# Patient Record
Sex: FEMALE | Race: BLACK OR AFRICAN AMERICAN | Employment: FULL TIME | URBAN - NONMETROPOLITAN AREA
[De-identification: names, ages, dates, MRNs, and addresses within clinical notes are randomized per-mention and may not be internally consistent; named-entity substitution may affect disease eponyms.]

---
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
## 2020-02-10 ENCOUNTER — TELEPHONE (OUTPATIENT)
Dept: PEDIATRICS | Age: 20
End: 2020-02-10

## 2020-02-18 ENCOUNTER — OFFICE VISIT (OUTPATIENT)
Dept: PEDIATRICS | Age: 20
End: 2020-02-18
Payer: COMMERCIAL

## 2020-02-18 VITALS
TEMPERATURE: 98 F | HEART RATE: 88 BPM | BODY MASS INDEX: 24.33 KG/M2 | SYSTOLIC BLOOD PRESSURE: 120 MMHG | HEIGHT: 66 IN | DIASTOLIC BLOOD PRESSURE: 80 MMHG | WEIGHT: 151.4 LBS

## 2020-02-18 PROBLEM — A54.9 GONORRHEA: Status: RESOLVED | Noted: 2019-09-17 | Resolved: 2020-02-18

## 2020-02-18 PROBLEM — A74.9 CHLAMYDIA INFECTION: Status: RESOLVED | Noted: 2019-09-17 | Resolved: 2020-02-18

## 2020-02-18 PROBLEM — B00.9 HERPES SIMPLEX VIRUS INFECTION: Status: RESOLVED | Noted: 2019-09-17 | Resolved: 2020-02-18

## 2020-02-18 PROBLEM — R45.851 SUICIDAL IDEATION: Status: RESOLVED | Noted: 2018-01-31 | Resolved: 2020-02-18

## 2020-02-18 LAB
CONTROL: NORMAL
PREGNANCY TEST URINE, POC: NEGATIVE

## 2020-02-18 PROCEDURE — 81025 URINE PREGNANCY TEST: CPT | Performed by: PHYSICIAN ASSISTANT

## 2020-02-18 PROCEDURE — 99395 PREV VISIT EST AGE 18-39: CPT | Performed by: PHYSICIAN ASSISTANT

## 2020-02-18 PROCEDURE — 96372 THER/PROPH/DIAG INJ SC/IM: CPT | Performed by: PHYSICIAN ASSISTANT

## 2020-02-18 RX ORDER — MEDROXYPROGESTERONE ACETATE 150 MG/ML
150 INJECTION, SUSPENSION INTRAMUSCULAR ONCE
Status: COMPLETED | OUTPATIENT
Start: 2020-02-18 | End: 2020-02-18

## 2020-02-18 RX ADMIN — MEDROXYPROGESTERONE ACETATE 150 MG: 150 INJECTION, SUSPENSION INTRAMUSCULAR at 14:34

## 2020-02-18 NOTE — PROGRESS NOTES
After obtaining consent, and per orders of Shauna Peacock PA-C, injection of Medroxyprogesterone 150 mg given in Right upper quad. Gluteus IM by Seema Gonzalez. Patient tolerated injection well, no reaction noted.  SM
with PE form     3. Depo today     Will transition pt to Dr. Ezra Preciado at [facility]; she will be much like I was with pt I feel and pt needs honest discussion with her. Follow up in 1year(s) for routine physical exam or sooner prn.          Marvin Berman PA-C

## 2020-02-18 NOTE — PATIENT INSTRUCTIONS
Parenting: Preparing for Adolescence     What is adolescence? Adolescence is the time from puberty until adulthood. Adolescence is becoming a longer period of time for many. Children are becoming sexually mature at earlier ages. Young adults are more often attending trade school, college, or graduate school rather than getting jobs after high school. How will my child change physically? Parents notice physical changes in their child when puberty begins. Puberty may start as early as age 9 for girls and as late as 12 for boys. Hormones cause physical changes as well as emotional changes for adolescents. Physical changes include:   Both girls and boys become taller.  Girls' breasts develop and hair grows in underarm and pubic areas.  Boys' voices deepen and hair grows on their face, underarms, and pubic areas.  Both boys and girls start to have strong sexual urges, and are able to become parents themselves. Make sure your teen knows they can come to you with their questions about sex, birth control, pregnancy, and sexually transmitted diseases. Even though these talks may make you uncomfortable, you want your child to know your values while being educated on these issues. Be clear with your teen how you feel about premarital sexual behaviors, what the risks are if they engage in these behaviors. If you value abstinence (not having sex) then make sure they know this! If you want your teen to use condoms and birth control if they engage in sexual behaviors, tell them how to get these items. How will my child's thinking abilities change? Teens in their early years have trouble understanding another person's perspective (particularly parents!). They believe that their experiences are so unique that no one (again, particularly parents) can understand what they are feeling. Young teens also struggle with abstract, logical thought.  Their thinking tends to be more concrete and they see most things in terms of black and white. Learning new abstract material, such as algebra, can be challenging for the young teen who thinks in black/white terms. Older teenagers are able to see more of the big picture. They also tend to question rather than accept information and values. This means they may engage in heated debates with parents over anything that they think is illogical about their parents' views. How will my child change socially? The main \"job\" or task of adolescence is for the teen to establish their identity. This means they will spend a great deal of time trying to decide who they are, what values they believe in, and what they want to accomplish in life. It is a time to start deciding for themselves what is right and wrong. Teens may try different behaviors in different situations to find out what fits best for them. For example, teens may try being studious, try drugs or alcohol, or try other behaviors because they want to be part of the popular crowd. Other teens may not struggle with the identity issue at all. They may simply accept their parents' values and expectations for their lives. Some teens deliberately choose values that are opposite of their parents. Some teens may not establish a firm identity until early adulthood or later. Adolescents establish their own identities by  themselves from their parents and becoming more influenced by their peers. This does not mean that parents lose the ability to influence their teenager. Most teens have views on politics, Zoroastrian, and social issues that are very close to their parents' views. Only 5% of all [place] teenagers state that they do not ever get along with their parents. The majority of teens do have positive relationships with their parents. What can I do to help my child? There are many things parents can do during this period to help, such as:   Encourage strong family relationships.  Listen and keep the lines of communication open between you and your child. Tell them often that you love them. Respect their privacy, unless they show unsafe behaviors. Discipline with love and common sense.  Make spirituality an important part of family life. Teens with strong Confucianism beliefs have lower rates of alcohol, cigarette, and marijuana use.  Help your child build connections with others by volunteering their time in a meaningful way.  Be aware that you are still your child's role model. Watch your use of alcohol, daily diet, exercise, and how you manage your anger.  Get to know their friends. Invite them over or volunteer to drive them to activities.  Encourage your child to participate in extracurricular activities. Be involved in the lives of your children. Attend their activities and know what their stressors are.  Help your child develop problem solving skills. Allow them to learn from the consequences of their actions.  Keep a sense of humor and maintain your perspective. Understand that their culture, music, and clothing styles will be different than what you are used to, and probably different that what you would like.  Admit your own mistakes to your child and apologize when needed.  Get professional help for teens who self-harm, abuse drugs or alcohol, or make suicidal or homicidal threats. We are committed to providing you with the best care possible. In order to help us achieve these goals please remember to bring all medications, herbal products, and over the counter supplements with you to each visit. If your provider has ordered testing for you, please be sure to follow up with our office if you have not received results within 7 days after the testing took place. *If you receive a survey after visiting one of our offices, please take time to share your experience concerning your physician office visit. These surveys are confidential and no health information about you is shared.   We are eager to improve for you and we are counting on your feedback to help make that happen. tb

## 2020-02-21 ENCOUNTER — TELEPHONE (OUTPATIENT)
Dept: PEDIATRICS | Age: 20
End: 2020-02-21

## 2020-02-21 LAB
QUANTI TB GOLD PLUS: NEGATIVE
QUANTI TB1 MINUS NIL: 0 IU/ML (ref 0–0.34)
QUANTI TB2 MINUS NIL: 0 IU/ML (ref 0–0.34)
QUANTIFERON MITOGEN: 7.65 IU/ML
QUANTIFERON NIL: 0.05 IU/ML

## 2020-02-21 NOTE — TELEPHONE ENCOUNTER
----- Message from Shauna Peacock PA-C sent at 2/21/2020 12:01 PM CST -----  Call pt and let her know TB test was negative.  Then print out her last office note and this lab and scan it and email it to her (email in demo is correct) she can print this off and take to her school

## 2020-07-07 ENCOUNTER — TELEPHONE (OUTPATIENT)
Dept: PEDIATRICS | Age: 20
End: 2020-07-07

## 2020-07-08 NOTE — TELEPHONE ENCOUNTER
Mom asking about who April wanted [facility] to establish care with.  Mom advised

## 2020-08-20 ENCOUNTER — OFFICE VISIT (OUTPATIENT)
Dept: PRIMARY CARE CLINIC | Age: 20
End: 2020-08-20
Payer: COMMERCIAL

## 2020-08-20 VITALS
RESPIRATION RATE: 16 BRPM | BODY MASS INDEX: 26.62 KG/M2 | SYSTOLIC BLOOD PRESSURE: 118 MMHG | HEART RATE: 78 BPM | WEIGHT: 159.8 LBS | HEIGHT: 65 IN | DIASTOLIC BLOOD PRESSURE: 80 MMHG | OXYGEN SATURATION: 99 % | TEMPERATURE: 98 F

## 2020-08-20 LAB
ALBUMIN SERPL-MCNC: 4.8 G/DL (ref 3.5–5.2)
ALP BLD-CCNC: 87 U/L (ref 35–104)
ALT SERPL-CCNC: 20 U/L (ref 5–33)
ANION GAP SERPL CALCULATED.3IONS-SCNC: 13 MMOL/L (ref 7–19)
AST SERPL-CCNC: 18 U/L (ref 5–32)
BASOPHILS ABSOLUTE: 0 K/UL (ref 0–0.2)
BASOPHILS RELATIVE PERCENT: 0.7 % (ref 0–1)
BILIRUB SERPL-MCNC: <0.2 MG/DL (ref 0.2–1.2)
BUN BLDV-MCNC: 15 MG/DL (ref 6–20)
CALCIUM SERPL-MCNC: 9.4 MG/DL (ref 8.6–10)
CHLORIDE BLD-SCNC: 104 MMOL/L (ref 98–111)
CO2: 24 MMOL/L (ref 22–29)
CREAT SERPL-MCNC: 0.8 MG/DL (ref 0.5–0.9)
EOSINOPHILS ABSOLUTE: 0.1 K/UL (ref 0–0.6)
EOSINOPHILS RELATIVE PERCENT: 1.7 % (ref 0–5)
GFR AFRICAN AMERICAN: >59
GFR NON-AFRICAN AMERICAN: >60
GLUCOSE BLD-MCNC: 78 MG/DL (ref 74–109)
HAV IGM SER IA-ACNC: NORMAL
HCT VFR BLD CALC: 40.7 % (ref 37–47)
HEMOGLOBIN: 13 G/DL (ref 12–16)
HEPATITIS B CORE IGM ANTIBODY: NORMAL
HEPATITIS B SURFACE ANTIGEN INTERPRETATION: NORMAL
HEPATITIS C ANTIBODY INTERPRETATION: NORMAL
IMMATURE GRANULOCYTES #: 0 K/UL
LYMPHOCYTES ABSOLUTE: 1.4 K/UL (ref 1.1–4.5)
LYMPHOCYTES RELATIVE PERCENT: 34.5 % (ref 20–40)
MCH RBC QN AUTO: 27.2 PG (ref 27–31)
MCHC RBC AUTO-ENTMCNC: 31.9 G/DL (ref 33–37)
MCV RBC AUTO: 85.1 FL (ref 81–99)
MONOCYTES ABSOLUTE: 0.3 K/UL (ref 0–0.9)
MONOCYTES RELATIVE PERCENT: 7.9 % (ref 0–10)
NEUTROPHILS ABSOLUTE: 2.3 K/UL (ref 1.5–7.5)
NEUTROPHILS RELATIVE PERCENT: 55 % (ref 50–65)
PDW BLD-RTO: 11.8 % (ref 11.5–14.5)
PLATELET # BLD: 294 K/UL (ref 130–400)
PMV BLD AUTO: 9.5 FL (ref 9.4–12.3)
POTASSIUM SERPL-SCNC: 3.6 MMOL/L (ref 3.5–5)
RBC # BLD: 4.78 M/UL (ref 4.2–5.4)
SODIUM BLD-SCNC: 141 MMOL/L (ref 136–145)
TOTAL PROTEIN: 7.3 G/DL (ref 6.6–8.7)
WBC # BLD: 4.2 K/UL (ref 4.8–10.8)

## 2020-08-20 PROCEDURE — 36415 COLL VENOUS BLD VENIPUNCTURE: CPT | Performed by: FAMILY MEDICINE

## 2020-08-20 PROCEDURE — 99203 OFFICE O/P NEW LOW 30 MIN: CPT | Performed by: FAMILY MEDICINE

## 2020-08-20 RX ORDER — ESCITALOPRAM OXALATE 10 MG/1
10 TABLET ORAL DAILY
Qty: 30 TABLET | Refills: 3 | Status: SHIPPED | OUTPATIENT
Start: 2020-08-20 | End: 2020-09-17 | Stop reason: SDUPTHER

## 2020-08-22 LAB — HIV 1,2 COMBO ANTIGEN/ANTIBODY: NEGATIVE

## 2020-08-26 ASSESSMENT — ENCOUNTER SYMPTOMS
EYE DISCHARGE: 0
BACK PAIN: 0
WHEEZING: 0
NAUSEA: 0
VOMITING: 0
DIARRHEA: 0
COLOR CHANGE: 0
COUGH: 0
ABDOMINAL PAIN: 0

## 2020-08-26 NOTE — PROGRESS NOTES
SUBJECTIVE:    Patient ID: Sabine Ortiz is a 21 y.o. female. HPI:     Patient presents today to establish care. She states that she is recently been incarcerated. She is wanting to have testing done for HIV and hepatitis. She states that she does not have any known exposures that she is aware of. She states that she just would like to be tested to rule that out. She states that she is having trouble with anxiety. She states that she is very stressed. She currently has a court date upcoming and states that this is very stressful for her. She states that she has a longstanding history of THC and marijuana usage. She states that she was self-medicating her stress and anxiety but now she cannot do that because she is on parole. She states that she just feels like she needs something because she has difficulty calming down and has difficulty turning her mind off. She states that she does stay anxious and irritable. She states that she has difficulty sleeping due to the anxiety. She has never been prescribed anything for anxiety. Past Medical History:   Diagnosis Date    Allergy     Environmental allergies      Current Outpatient Medications on File Prior to Visit   Medication Sig Dispense Refill    medroxyPROGESTERone (DEPO-PROVERA) 150 MG/ML injection Inject 1 mL into the muscle once for 1 dose 1 mL 3     No current facility-administered medications on file prior to visit.       No Known Allergies  Past Surgical History:   Procedure Laterality Date    ADENOIDECTOMY  07-        TONSILLECTOMY  07-    Dr Young Major       Family History   Problem Relation Age of Onset    Hypertension Mother      Social History     Socioeconomic History    Marital status: Single     Spouse name: Not on file    Number of children: Not on file    Years of education: Not on file    Highest education level: Not on file   Occupational History    Not on file

## 2020-09-17 ENCOUNTER — OFFICE VISIT (OUTPATIENT)
Dept: PRIMARY CARE CLINIC | Age: 20
End: 2020-09-17
Payer: COMMERCIAL

## 2020-09-17 VITALS
DIASTOLIC BLOOD PRESSURE: 70 MMHG | TEMPERATURE: 98.4 F | BODY MASS INDEX: 27.16 KG/M2 | WEIGHT: 163 LBS | HEART RATE: 61 BPM | OXYGEN SATURATION: 99 % | RESPIRATION RATE: 16 BRPM | HEIGHT: 65 IN | SYSTOLIC BLOOD PRESSURE: 110 MMHG

## 2020-09-17 PROCEDURE — 99213 OFFICE O/P EST LOW 20 MIN: CPT | Performed by: FAMILY MEDICINE

## 2020-09-17 RX ORDER — ESCITALOPRAM OXALATE 20 MG/1
20 TABLET ORAL DAILY
Qty: 30 TABLET | Refills: 5 | Status: SHIPPED | OUTPATIENT
Start: 2020-09-17 | End: 2022-04-23

## 2020-09-17 RX ORDER — BUSPIRONE HYDROCHLORIDE 7.5 MG/1
7.5 TABLET ORAL 3 TIMES DAILY
Qty: 90 TABLET | Refills: 0 | Status: SHIPPED | OUTPATIENT
Start: 2020-09-17 | End: 2020-10-17

## 2020-09-24 ASSESSMENT — ENCOUNTER SYMPTOMS
WHEEZING: 0
BACK PAIN: 0
COLOR CHANGE: 0
VOMITING: 0
DIARRHEA: 0
NAUSEA: 0
ABDOMINAL PAIN: 0
COUGH: 0
EYE DISCHARGE: 0

## 2020-09-24 NOTE — PROGRESS NOTES
SUBJECTIVE:    Patient ID: Debbie Barba is a 21 y.o. female. HPI:   Patient presents today for 1 month follow-up. She states the Lexapro has not really helped much. She states that she does feel a little bit of a difference but not significantly. She states that she is still staying very anxious. She states that she does smoke a lot of weed. She states that she just has difficulty finding that anxiety relief from anything other than that. She states that she stills feels like she is edgy and does feel like she occasionally has panic attacks. She does not have any depression. She denies any suicidal thoughts. She denies any side effects to the medication. Past Medical History:   Diagnosis Date    Allergy     Environmental allergies       Current Outpatient Medications   Medication Sig Dispense Refill    escitalopram (LEXAPRO) 20 MG tablet Take 1 tablet by mouth daily 30 tablet 5    busPIRone (BUSPAR) 7.5 MG tablet Take 1 tablet by mouth 3 times daily 90 tablet 0     No current facility-administered medications for this visit. No Known Allergies    Review of Systems   Constitutional: Negative for activity change, appetite change and fever. HENT: Negative for congestion and nosebleeds. Eyes: Negative for discharge. Respiratory: Negative for cough and wheezing. Cardiovascular: Negative for chest pain and leg swelling. Gastrointestinal: Negative for abdominal pain, diarrhea, nausea and vomiting. Genitourinary: Negative for difficulty urinating, frequency and urgency. Musculoskeletal: Negative for back pain and gait problem. Skin: Negative for color change and rash. Neurological: Negative for dizziness and headaches. Hematological: Does not bruise/bleed easily. Psychiatric/Behavioral: Negative for sleep disturbance and suicidal ideas. The patient is nervous/anxious. OBJECTIVE:     Physical Exam  Vitals signs reviewed.    Constitutional:       General: She is not in acute distress. Appearance: Normal appearance. She is well-developed. She is not diaphoretic. HENT:      Head: Normocephalic and atraumatic. Right Ear: External ear normal.      Left Ear: External ear normal.   Neck:      Musculoskeletal: Normal range of motion and neck supple. Cardiovascular:      Rate and Rhythm: Normal rate and regular rhythm. Pulses: Normal pulses. Heart sounds: Normal heart sounds. No murmur. Pulmonary:      Effort: Pulmonary effort is normal. No respiratory distress. Breath sounds: Normal breath sounds. Skin:     General: Skin is warm and dry. Neurological:      General: No focal deficit present. Mental Status: She is alert and oriented to person, place, and time. Mental status is at baseline. Psychiatric:         Mood and Affect: Mood normal.         Behavior: Behavior normal.         Thought Content: Thought content normal.         Judgment: Judgment normal.        /70 (Site: Left Upper Arm, Position: Sitting, Cuff Size: Medium Adult)   Pulse 61   Temp 98.4 °F (36.9 °C) (Temporal)   Resp 16   Ht 5' 5\" (1.651 m)   Wt 163 lb (73.9 kg)   SpO2 99%   BMI 27.12 kg/m²      ASSESSMENT:    Андрей was seen today for 1 month follow-up. Diagnoses and all orders for this visit:    Anxiety    Stress    Other orders  -     escitalopram (LEXAPRO) 20 MG tablet; Take 1 tablet by mouth daily  -     busPIRone (BUSPAR) 7.5 MG tablet; Take 1 tablet by mouth 3 times daily        PLAN:    Increase Lexapro to 20 mg daily. I am going to add BuSpar to her medications as well to see if this will help take more of the edge off anxiety. Follow-up with us in 4 weeks for recheck unless needed sooner. EMR Dragon/transcription disclaimer:  Much of this encounter note is electronic transcription/translation of spoken language toprinted texts.   The electronic translation of spoken language may be erroneous, or at times, nonsensical words or phrases may be inadvertently transcribed.   Although I have reviewed the note for such errors, some may stillexist.

## 2020-11-01 ENCOUNTER — HOSPITAL ENCOUNTER (EMERGENCY)
Age: 20
Discharge: HOME | End: 2020-11-01
Payer: COMMERCIAL

## 2020-11-01 VITALS
OXYGEN SATURATION: 100 % | HEIGHT: 65 IN | BODY MASS INDEX: 25.49 KG/M2 | WEIGHT: 153 LBS | RESPIRATION RATE: 20 BRPM | TEMPERATURE: 98 F | HEART RATE: 75 BPM

## 2020-11-01 PROCEDURE — 99999 PR OFFICE/OUTPT VISIT,PROCEDURE ONLY: CPT | Performed by: PHYSICIAN ASSISTANT

## 2020-11-01 PROCEDURE — 99283 EMERGENCY DEPT VISIT LOW MDM: CPT

## 2020-11-01 RX ORDER — VALACYCLOVIR HYDROCHLORIDE 1 G/1
1000 TABLET, FILM COATED ORAL DAILY
Qty: 5 TABLET | Refills: 0 | Status: SHIPPED | OUTPATIENT
Start: 2020-11-01 | End: 2021-04-30 | Stop reason: SDUPTHER

## 2020-11-01 ASSESSMENT — ENCOUNTER SYMPTOMS
EYE PAIN: 0
SHORTNESS OF BREATH: 0
ABDOMINAL DISTENTION: 0
COLOR CHANGE: 0
COUGH: 0
APNEA: 0
RHINORRHEA: 0
PHOTOPHOBIA: 0
EYE DISCHARGE: 0
NAUSEA: 0
SORE THROAT: 0
ABDOMINAL PAIN: 0
BACK PAIN: 0

## 2020-11-01 ASSESSMENT — PAIN SCALES - GENERAL: PAINLEVEL_OUTOF10: 4

## 2020-11-01 NOTE — ED PROVIDER NOTES
[facility]  eMERGENCYdEPARTMENT eNCOUnter      Pt Name: Jassi Chanel  MRN: [id number]  [place] 2000  Date of evaluation: 11/1/2020  [address] [place] [address]    CHIEF COMPLAINT       Chief Complaint   Patient presents with    Insect Bite     Pt to ED with c/o possible spider bite to right glut         HISTORY OF PRESENT ILLNESS  (Location/Symptom, Timing/Onset, Context/Setting, Quality, Duration, Modifying Factors, Severity.)   Jassi Chanel is a 21 y.o. female who presents to the emergency department with complaints of shingles wound about right hamstring started Thursday. Correlates with sleeping with pants wondering if spider was in her pants. Is vesicular in character developing more and more neuritis characteristic. HPI    Nursing Notes were reviewed and I agree. REVIEW OF SYSTEMS    (2-9 systems for level 4, 10 or more for level 5)     Review of Systems   Constitutional: Negative for activity change, appetite change, chills and fever. HENT: Negative for congestion, postnasal drip, rhinorrhea and sore throat. Eyes: Negative for photophobia, pain, discharge and visual disturbance. Respiratory: Negative for apnea, cough and shortness of breath. Cardiovascular: Negative for chest pain and leg swelling. Gastrointestinal: Negative for abdominal distention, abdominal pain and nausea. Genitourinary: Negative for vaginal bleeding. Musculoskeletal: Negative for arthralgias, back pain, joint swelling, neck pain and neck stiffness. Skin: Positive for wound. Negative for color change and rash. Neurological: Negative for dizziness, syncope, facial asymmetry and headaches. Hematological: Negative for adenopathy. Does not bruise/bleed easily. Psychiatric/Behavioral: Negative for agitation, behavioral problems and confusion. Except as noted above the remainder of the review of systems was reviewed and negative.        PAST MEDICAL HISTORY     Past Medical History: Diagnosis Date    Allergy     Environmental allergies          SURGICAL HISTORY       Past Surgical History:   Procedure Laterality Date    ADENOIDECTOMY  07-        TONSILLECTOMY  07-    Dr Alivia Aguirre       Previous Medications    ESCITALOPRAM (LEXAPRO) 20 MG TABLET    Take 1 tablet by mouth daily       ALLERGIES     Patient has no known allergies.     FAMILY HISTORY       Family History   Problem Relation Age of Onset    Hypertension Mother           SOCIAL HISTORY       Social History     Socioeconomic History    Marital status: Single     Spouse name: None    Number of children: None    Years of education: None    Highest education level: None   Occupational History    None   Social Needs    Financial resource strain: None    Food insecurity     Worry: None     Inability: None    Transportation needs     Medical: None     Non-medical: None   Tobacco Use    Smoking status: Never Smoker    Smokeless tobacco: Never Used   Substance and Sexual Activity    Alcohol use: No    Drug use: Yes     Types: Marijuana    Sexual activity: Yes     Partners: Male     Birth control/protection: Injection   Lifestyle    Physical activity     Days per week: None     Minutes per session: None    Stress: None   Relationships    Social connections     Talks on phone: None     Gets together: None     Attends Latter day service: None     Active member of club or organization: None     Attends meetings of clubs or organizations: None     Relationship status: None    Intimate partner violence     Fear of current or ex partner: None     Emotionally abused: None     Physically abused: None     Forced sexual activity: None   Other Topics Concern    None   Social History Narrative    None       SCREENINGS           PHYSICAL EXAM    (up to 7 forlevel 4, 8 or more for level 5)     ED Triage Vitals [11/01/20 1533]   BP Temp Temp src Pulse Resp SpO2 Height Weight   -- 98 °F (36.7 °C) -- 75 20 100 % 5' 5\" (1.651 m) 153 lb (69.4 kg)       Physical Exam  Vitals signs and nursing note reviewed. Constitutional:       General: She is not in acute distress. Appearance: Normal appearance. She is well-developed. She is not diaphoretic. HENT:      Head: Normocephalic and atraumatic. Right Ear: External ear normal.      Left Ear: External ear normal.      Mouth/Throat:      Pharynx: No oropharyngeal exudate. Eyes:      General:         Right eye: No discharge. Left eye: No discharge. Pupils: Pupils are equal, round, and reactive to light. Neck:      Musculoskeletal: Normal range of motion and neck supple. Thyroid: No thyromegaly. Cardiovascular:      Rate and Rhythm: Normal rate and regular rhythm. Pulses: Normal pulses. Heart sounds: Normal heart sounds. No murmur. No friction rub. Pulmonary:      Effort: Pulmonary effort is normal. No respiratory distress. Breath sounds: Normal breath sounds. No stridor. No wheezing. Abdominal:      General: Bowel sounds are normal. There is no distension. Palpations: Abdomen is soft. Tenderness: There is no abdominal tenderness. Musculoskeletal: Normal range of motion. Legs:    Skin:     General: Skin is warm and dry. Capillary Refill: Capillary refill takes less than 2 seconds. Findings: No rash. Neurological:      General: No focal deficit present. Mental Status: She is alert and oriented to person, place, and time. Cranial Nerves: No cranial nerve deficit. Sensory: No sensory deficit. Coordination: Coordination normal.   Psychiatric:         Mood and Affect: Mood normal.         Behavior: Behavior normal.         Thought Content:  Thought content normal.         Judgment: Judgment normal.           DIAGNOSTIC RESULTS     RADIOLOGY:   Non-plain film images such as CT, Ultrasound and MRI are read by the radiologist. Mirtha Valadez radiographic images are visualized and preliminarilyinterpreted by No att. providers found with the below findings:      Interpretation per the Radiologist below, if available at the time of this note:    No orders to display       LABS:  Labs Reviewed - No data to display    All other labs were within normal range or notreturned as of this dictation. RE-ASSESSMENT        EMERGENCY DEPARTMENT COURSE and DIFFERENTIAL DIAGNOSIS/MDM:   Vitals:    Vitals:    11/01/20 1533   Pulse: 75   Resp: 20   Temp: 98 °F (36.7 °C)   SpO2: 100%   Weight: 153 lb (69.4 kg)   Height: 5' 5\" (1.651 m)       MDM  Findings consistent with shingles over 72 hours out will prescribe naproxen and vesicle appear however follow closely with PCP in 2 to 3 days. PROCEDURES:    Procedures      FINAL IMPRESSION      1.  Herpes zoster without complication          DISPOSITION/PLAN   DISPOSITION Decision To Discharge 11/01/2020 04:05:33 PM      PATIENT REFERRED TO:  [facility] EMERGENCY DEPT  [facility]  [phone]    If symptoms worsen    Tania Pelletier MD  [address] [place] [address]  [phone]    In 3 days  For wound re-check      DISCHARGE MEDICATIONS:  New Prescriptions    NAPROXEN (NAPROXEN) 500 MG EC TABLET    Take 1 tablet by mouth 2 times daily (with meals)    VALACYCLOVIR (VALTREX) 1 G TABLET    Take 1 tablet by mouth daily for 5 days       (Please note that portions of this note were completed with a voice recognition program.  Efforts were made to edit the dictations but occasionallywords are mis-transcribed.)    Madiha Horner 981, [address]  11/01/20 2087

## 2020-11-01 NOTE — ED NOTES
Darío Guzman [place] and female RN at bedside to assess gluteal insect bite.      Radha Madrid RN  11/01/20 9304

## 2020-11-17 ENCOUNTER — OFFICE VISIT (OUTPATIENT)
Dept: PRIMARY CARE CLINIC | Age: 20
End: 2020-11-17
Payer: COMMERCIAL

## 2020-11-17 VITALS
HEIGHT: 65 IN | SYSTOLIC BLOOD PRESSURE: 124 MMHG | DIASTOLIC BLOOD PRESSURE: 78 MMHG | BODY MASS INDEX: 28.32 KG/M2 | OXYGEN SATURATION: 99 % | TEMPERATURE: 98.9 F | WEIGHT: 170 LBS | HEART RATE: 75 BPM

## 2020-11-17 PROCEDURE — 99213 OFFICE O/P EST LOW 20 MIN: CPT | Performed by: NURSE PRACTITIONER

## 2020-11-17 RX ORDER — AMOXICILLIN AND CLAVULANATE POTASSIUM 875; 125 MG/1; MG/1
1 TABLET, FILM COATED ORAL 2 TIMES DAILY
Qty: 14 TABLET | Refills: 0 | Status: SHIPPED | OUTPATIENT
Start: 2020-11-17 | End: 2020-11-24

## 2020-11-17 ASSESSMENT — ENCOUNTER SYMPTOMS
FACIAL SWELLING: 0
EYES NEGATIVE: 1
GASTROINTESTINAL NEGATIVE: 1
RESPIRATORY NEGATIVE: 1
ALLERGIC/IMMUNOLOGIC NEGATIVE: 1

## 2020-11-17 NOTE — PROGRESS NOTES
[address] [facility]  [address] [address] [place] [address]  [address] [place] [address]  Dept: [phone]  Dept Fax: [phone]  Loc: [phone]    Uvaldo Jennings is a 21 y.o. female who presents today for her medical conditions/complaints as noted below. Uvaldo Jennings is c/o of Ear Problem (Right ear pain, pressure x 4 days ago. Feels like its going to pop. )        HPI:     HPI     Pt reports increasing ear pain to her right ear over the last 4 days. She reports previously being told that she had a small hole in that ear drum but that they would watch it. She reports using Q-tips to clean her ears and after using it 4 days ago it started hurting very bad. She believes that she has an infection. She has seen Dr. Marvel Garsia for ENT needs. Chief Complaint   Patient presents with    Ear Problem     Right ear pain, pressure x 4 days ago. Feels like its going to pop.       Past Medical History:   Diagnosis Date    Allergy     Environmental allergies       Past Surgical History:   Procedure Laterality Date    ADENOIDECTOMY  07-        TONSILLECTOMY  07-    Dr Bunch    TYMPANOSTOMY [facility] 11/17/2020 11/1/2020 9/17/2020 8/20/2020 2/18/2020 76/62/1219   SYSTOLIC 225 - [phone]   DIASTOLIC 78 - 70 80 80 81   Site - - Left Upper Arm Left Upper Arm Right Upper Arm Left Upper Arm   Position - - Sitting Sitting Sitting Sitting   Cuff Size - - Medium Adult Medium Adult Medium Adult Medium Adult   Pulse 75 75 61 78 88 70   Temp 98.9 98 98.4 98 98 -   Resp - 20 16 16 - -   SpO2 99 100 99 99 - -   Weight 170 lb 153 lb 163 lb 159 lb 12.8 oz 151 lb 6.4 oz 140 lb   Height 5' 5\" 5' 5\" 5' 5\" 5' 5\" 5' 6.25\" 5' 5\"   Body mass index 28.29 kg/m2 25.46 kg/m2 27.12 kg/m2 26.59 kg/m2 24.25 kg/m2 23.29 kg/m2   Pain Level - 4 - - - -   Some recent data might be hidden       Family History   Problem Relation Age of Onset    Hypertension Mother        Social History     Tobacco Use    Smoking status: Never Smoker    Smokeless tobacco: Never Used   Substance Use Topics    Alcohol use: No      Current Outpatient Medications   Medication Sig Dispense Refill    amoxicillin-clavulanate (AUGMENTIN) 875-125 MG per tablet Take 1 tablet by mouth 2 times daily for 7 days 14 tablet 0    naproxen (NAPROXEN) 500 MG EC tablet Take 1 tablet by mouth 2 times daily (with meals) 60 tablet 0    escitalopram (LEXAPRO) 20 MG tablet Take 1 tablet by mouth daily 30 tablet 5     No current facility-administered medications for this visit. No Known Allergies    Health Maintenance   Topic Date Due    Chlamydia screen  08/29/2020    Flu vaccine (1) 09/01/2020    DTaP/Tdap/Td vaccine (8 - Td) 07/31/2027    Hepatitis B vaccine  Completed    Hib vaccine  Completed    HPV vaccine  Completed    Varicella vaccine  Completed    Meningococcal (ACWY) vaccine  Completed    HIV screen  Completed    Hepatitis A vaccine  Aged Out    Pneumococcal 0-64 years Vaccine  Aged Out       Subjective:      Review of Systems   Constitutional: Negative for chills and fever. HENT: Positive for ear discharge and ear pain. Negative for congestion, facial swelling and hearing loss. Eyes: Negative. Respiratory: Negative. Cardiovascular: Negative. Gastrointestinal: Negative. Endocrine: Negative. Genitourinary: Negative. Musculoskeletal: Negative. Skin: Negative. Allergic/Immunologic: Negative. Neurological: Negative for dizziness, light-headedness and headaches. Hematological: Negative. Psychiatric/Behavioral: Positive for agitation. Objective:     Physical Exam  Vitals signs and nursing note reviewed. Constitutional:       General: She is not in acute distress. Appearance: Normal appearance. She is not ill-appearing or toxic-appearing. HENT:      Head: Normocephalic and atraumatic. Jaw: There is normal jaw occlusion.       Right Ear: Hearing normal. Swelling and tenderness present. Tympanic membrane is perforated and erythematous. Left Ear: Hearing, tympanic membrane, ear canal and external ear normal. Tympanic membrane is not erythematous. Ears:        Nose: Nose normal.   Eyes:      Extraocular Movements: Extraocular movements intact. Conjunctiva/sclera: Conjunctivae normal.      Pupils: Pupils are equal, round, and reactive to light. Neck:      Musculoskeletal: Normal range of motion and neck supple. Cardiovascular:      Rate and Rhythm: Normal rate and regular rhythm. Pulses: Normal pulses. Heart sounds: Normal heart sounds. No murmur. No friction rub. No gallop. Pulmonary:      Effort: Pulmonary effort is normal. No respiratory distress. Breath sounds: Normal breath sounds. No stridor. No wheezing, rhonchi or rales. Musculoskeletal: Normal range of motion. Skin:     General: Skin is warm and dry. Coloration: Skin is not jaundiced or pale. Findings: No erythema or rash. Neurological:      Mental Status: She is alert and oriented to person, place, and time. Psychiatric:         Mood and Affect: Mood normal.         Behavior: Behavior normal.         Thought Content: Thought content normal.         Judgment: Judgment normal.       /78   Pulse 75   Temp 98.9 °F (37.2 °C)   Ht 5' 5\" (1.651 m)   Wt 170 lb (77.1 kg)   LMP  (LMP Unknown)   SpO2 99%   BMI 28.29 kg/m²     Assessment:       Diagnosis Orders   1. Right ear pain  External Referral To ENT   2. Otitis media, serous, tm rupture, right           Plan:   More than 50% of the time was spent counseling and coordinating care for a total time of 15 min face to face. Refer to ENT today. Start benadryl at night  To dry secretions. Tylenol or ibuprofen for pain and inflammation. Antibiotic Therapy  Take your antibiotic as prescribed. Take all of your antibiotics. You should not have any left over. Many antibiotics may cause diarrhea. . you can start an OTC probiotic to support normal gut bacteria. If you are on birth control, you need to use an alternative method of contraceptive for one month as antibiotics can reduce the effectiveness of oral BC. Always monitor for signs of allergic reaction such as itching, hives or any difficulty swallowing or breathing STOP THE MEDICATION IMMEDIATELY. If difficulty breathing, call 911 and go to the ER. If hives and itching, contact provider through [address] or phone for alternative antibiotics or be seen if necessary. Patient given educational materials -see patient instructions. Discussed use, benefit, and side effects of prescribed medications. All patient questions answered. Pt voiced understanding. Reviewed health maintenance. Instructed to continue currentmedications, diet and exercise. Patient agreed with treatment plan. Follow up as directed. MEDICATIONS:  Orders Placed This Encounter   Medications    amoxicillin-clavulanate (AUGMENTIN) 875-125 MG per tablet     Sig: Take 1 tablet by mouth 2 times daily for 7 days     Dispense:  14 tablet     Refill:  0         ORDERS:  Orders Placed This Encounter   Procedures    External Referral To ENT       Follow-up:  Return if symptoms worsen or fail to improve. PATIENT INSTRUCTIONS:  Patient Instructions   Antibiotic Therapy  Take your antibiotic as prescribed. Take all of your antibiotics. You should not have any left over. Many antibiotics may cause diarrhea. . you can start an OTC probiotic to support normal gut bacteria. If you are on birth control, you need to use an alternative method of contraceptive for one month as antibiotics can reduce the effectiveness of oral BC. Always monitor for signs of allergic reaction such as itching, hives or any difficulty swallowing or breathing STOP THE MEDICATION IMMEDIATELY. If difficulty breathing, call 911 and go to the ER.   If hives and itching, contact provider through [address] or phone for alternative antibiotics or be seen if necessary. Electronically signed by REGINE Moses NP on 11/17/2020 at 1:39 PM    EMR Dragon/transcription disclaimer:  Much of thisencounter note is electronic transcription/translation of spoken language to printed texts. The electronic translation of spoken language may be erroneous, or at times, nonsensical words or phrases may be inadvertentlytranscribed.   Although I have reviewed the note for such errors, some may still exist.

## 2020-11-17 NOTE — PATIENT INSTRUCTIONS
Antibiotic Therapy  Take your antibiotic as prescribed. Take all of your antibiotics. You should not have any left over. Many antibiotics may cause diarrhea. . you can start an OTC probiotic to support normal gut bacteria. If you are on birth control, you need to use an alternative method of contraceptive for one month as antibiotics can reduce the effectiveness of oral BC. Always monitor for signs of allergic reaction such as itching, hives or any difficulty swallowing or breathing STOP THE MEDICATION IMMEDIATELY. If difficulty breathing, call 911 and go to the ER. If hives and itching, contact provider through [address] or phone for alternative antibiotics or be seen if necessary.

## 2020-11-18 ENCOUNTER — PROCEDURE VISIT (OUTPATIENT)
Dept: OTOLARYNGOLOGY | Facility: CLINIC | Age: 20
End: 2020-11-18

## 2020-11-18 ENCOUNTER — OFFICE VISIT (OUTPATIENT)
Dept: OTOLARYNGOLOGY | Facility: CLINIC | Age: 20
End: 2020-11-18

## 2020-11-18 VITALS
SYSTOLIC BLOOD PRESSURE: 119 MMHG | HEART RATE: 72 BPM | BODY MASS INDEX: 28.32 KG/M2 | DIASTOLIC BLOOD PRESSURE: 83 MMHG | HEIGHT: 65 IN | WEIGHT: 170 LBS

## 2020-11-18 DIAGNOSIS — H92.11 OTORRHEA, RIGHT: ICD-10-CM

## 2020-11-18 DIAGNOSIS — H90.11 CONDUCTIVE HEARING LOSS OF RIGHT EAR WITH UNRESTRICTED HEARING OF LEFT EAR: ICD-10-CM

## 2020-11-18 DIAGNOSIS — H72.91 PERFORATED TYMPANIC MEMBRANE ON EXAMINATION, RIGHT: Primary | ICD-10-CM

## 2020-11-18 DIAGNOSIS — J34.3 NASAL TURBINATE HYPERTROPHY: ICD-10-CM

## 2020-11-18 PROCEDURE — 92567 TYMPANOMETRY: CPT | Performed by: AUDIOLOGIST-HEARING AID FITTER

## 2020-11-18 PROCEDURE — 99203 OFFICE O/P NEW LOW 30 MIN: CPT | Performed by: OTOLARYNGOLOGY

## 2020-11-18 PROCEDURE — 92557 COMPREHENSIVE HEARING TEST: CPT | Performed by: AUDIOLOGIST-HEARING AID FITTER

## 2020-11-18 RX ORDER — BUSPIRONE HYDROCHLORIDE 7.5 MG/1
TABLET ORAL
COMMUNITY
Start: 2020-09-17

## 2020-11-18 RX ORDER — AMOXICILLIN AND CLAVULANATE POTASSIUM 875; 125 MG/1; MG/1
1 TABLET, FILM COATED ORAL
COMMUNITY
Start: 2020-11-17 | End: 2020-11-24

## 2020-11-18 RX ORDER — ESCITALOPRAM OXALATE 20 MG/1
20 TABLET ORAL
COMMUNITY
Start: 2020-09-17

## 2020-11-18 RX ORDER — OXYMETAZOLINE HYDROCHLORIDE 0.05 G/100ML
2 SPRAY NASAL 3 TIMES DAILY
Qty: 2 ML | Refills: 0 | Status: SHIPPED | OUTPATIENT
Start: 2020-11-18 | End: 2020-11-21

## 2020-11-18 RX ORDER — NAPROXEN 500 MG/1
TABLET ORAL
COMMUNITY
Start: 2020-11-01

## 2020-11-18 RX ORDER — FLUTICASONE PROPIONATE 50 MCG
2 SPRAY, SUSPENSION (ML) NASAL 2 TIMES DAILY
Qty: 48 G | Refills: 3 | Status: SHIPPED | OUTPATIENT
Start: 2020-11-18

## 2020-11-18 NOTE — PROGRESS NOTES
Deandre Wilcox Jr, MD     ENT NEW PATIENT NOTE     Chief Complaint   Patient presents with   • Perforation        HISTORY OF PRESENT ILLNESS:  Accompanied by:   No one  SaAbdullahi Mon is a  20 y.o. female with perf in tympanic membrane.  She had a small hole last year. She was having ear problem last year and told small hole then.  She placed water in ear. She then started draining.  Dr Hill placed tubes in ears years ago.  No issues until now.  Smoke- none  Drink- none  Hearing- noted decrease recently  Ringing- None  Dizziness- none    Review of Systems   Constitutional: Negative for appetite change, fatigue and fever.   HENT:        See HPI   Respiratory: Negative for cough, choking and shortness of breath.    Gastrointestinal: Negative for diarrhea, nausea and vomiting.   Skin: Negative for rash.   Neurological: Negative for dizziness, light-headedness and headaches.   Psychiatric/Behavioral: Negative for sleep disturbance.       Past History:  History reviewed. No pertinent past medical history.  Past Surgical History:   Procedure Laterality Date   • EAR TUBES     • TONSILLECTOMY       History reviewed. No pertinent family history.  Social History     Tobacco Use   • Smoking status: Never Smoker   • Smokeless tobacco: Never Used   Substance Use Topics   • Alcohol use: Never     Frequency: Never   • Drug use: Not on file     Outpatient Medications Marked as Taking for the 11/18/20 encounter (Office Visit) with Deandre Wilcox Jr., MD   Medication Sig Dispense Refill   • amoxicillin-clavulanate (AUGMENTIN) 875-125 MG per tablet Take 1 tablet by mouth.     • escitalopram (LEXAPRO) 20 MG tablet Take 20 mg by mouth.       Allergies:  Patient has no known allergies.        Vital Signs:   Heart Rate:  [72] 72  BP: (119)/(83) 119/83  EXAMINATION:  CONSTITUTIONAL:    well nourished, well-developed, alert, oriented, in no acute distress     BODY HABITUS:    Normal body habitus    COMMUNICATION:    able  to communicate normally, normal voice quality    HEAD:     Normocephalic, without obvious abnormality, atraumatic    FACE:    structure normal, no tenderness present, no lesions/masses, no evidence of trauma    SALIVARY GLANDS:    parotid glands with no tenderness, no swelling, no masses, submandibular glands with normal size, nontender     EYE:    ocular motility normal, eyelids normal, orbits normal, no proptosis, conjunctiva clear, sclera non-icteric, pupils equal, round, reactive to light and accomodation  Color:   brown    HEARING:    response to conversational voice normal    EARS:    Otoscopic exam      Bilateral  Left  normal pinna with no lesions, Canals normal size and shape, Tympanic membranes normal, Ossicular chain intact, Middle ear clear   EXTERNAL AUDITORY CANALS:   Right    normal ear canals without stenosis or significant cerumen, skin intact and normal shape   TYMPANIC MEMBRANES:     Abnormal tympanic membrane:        RIGHT: Slightly thickened, slightly dull, 10 to 15% perforation anterior to the mid and upper portion of the long process of the malleus with well-healed edges   MIDDLE EAR:     Abnormal:       RIGHT: Mildly thick, violaceous, nongranulated, no otorrhea    Ossicular chain:      intact:  Right   MASTOID:   Right    Non-surgical, Non-tender    PINNA:   Right    normal, non-tender, skin intact without lesions    AS:       AD:    NOSE EXTERNAL:    APPEARANCE: normal, straight, with good projection, no tenderness, no lesions, no tenderness, good nasal support, patent nares    NOSE INTERNAL:    Anterior rhinoscopy   NASALMUCOSA:    abnormal:        Bilateral- Bluish boggy and thick    NASAL PASSAGES:     abnormal   Bilateral- Markedly narrowed    NASAL VALVE:     intact, good support and no nasal obstruction   SEPTUM:     mucosa abnormal   Bilateral- Bluish, boggy     midline   INFERIOR TURBINATES:     abnormal  Bilateral- Bluish, very enlarged and obstructing, boggy     ORAL CAVITY:     Normal lips with no lesions, dentition normal for age, FOM intact without lesions and normal salivary flow, Mucosa intact without lesions, Hard and soft palate normal without lesions    OROPHARYNX:    Direct examination  oropharyngeal mucosa normal, tonsil fossa(e) with normal appearance      NECK:    normal appearance, no masses, no lesions, larynx normal mobility, trachea midline    LYMPH NODES :    no adenopathy    THYROID:    no overt thyromegaly, no tenderness, nodules or mass present on palpation, position midline    CHEST/RESPIRATORY:    respiratory effort normal, no rales, rubs or wheezing, no stridor, normal appearance to chest    CARDIOVASCULAR:    regular rate and rhythm, no murmurs, gallups, no peripheral edema    NEURO/PSYCHIATRIC :    oriented appropriately for age, mood normal, affect appropriate, cranial nerves intact grossly (unless specifically described), gait normal for age    RESULTS REVIEW:    I reviewed the patient's new clinical results.  Audiologic testing reviewed.              ASSESSMENT:     Diagnosis Plan   1. Perforated tympanic membrane on examination, right  Comprehensive Hearing Test    Anterior to malleus approximately 10 to 15%, dry today   2. Conductive hearing loss of right ear with unrestricted hearing of left ear  Comprehensive Hearing Test    By audio, but historically right hearing is not down much   3. Otorrhea, right      Improved today   4. Nasal turbinate hypertrophy      Bilateral IT, suspicious for allergic rhinitis            PLAN:      Medical and surgical options were discussed including observation and surgical management. Risks, benefits and alternatives were discussed and questions were answered. After considering the options, the patient decided to proceed with surgical management.  Patient has she thinks is a fresh perforation.  I feel like this is an older perforation.  She wishes to try minimal procedure to begin with.  I will plan right myringoplasty with  freshening of the edges and possible paper myringoplasty.  If she fails this I would consider full-scale tympanoplasty on the right ear.  Patient has a history of prior PE tubes and I suspect she has had a small perforation present for a number of years.  After discussion of risk and benefits, the patient was to proceed with an in office procedure to try to repair the tympanic membrane.  I will wait approximately 2 weeks to make sure that the middle ear remains dry on the right side for the greatest chance of success for myringoplasties.  IOP myringoplasty Right  Water precautions  MY CHART:  Patient is   New Medications Ordered This Visit   Medications   • oxymetazoline (AFRIN) 0.05 % nasal spray     Si sprays into the nostril(s) as directed by provider 3 (Three) Times a Day for 3 days. Use for 3 days then stop     Dispense:  2 mL     Refill:  0   • fluticasone (FLONASE) 50 MCG/ACT nasal spray     Si sprays into the nostril(s) as directed by provider 2 (Two) Times a Day.     Dispense:  48 g     Refill:  3     Orders Placed This Encounter   Procedures   • Comprehensive Hearing Test          Patient understand(s) and agree(s) with the treatment plan as described.  Return in about 2 weeks (around 2020) for Recheck R ear.       Deandre Wilcox Jr, MD  20  13:39 CST

## 2020-11-18 NOTE — PATIENT INSTRUCTIONS
WATER PRECAUTIONS FOR EARS    Protecting your ears from water may sometimes be necessary for the health of your ears.     > Ear plugs: You may use earplugs: over the counter silicone plugs or a cotton ball coated with vasoline when bathing. If conservative measures are not working, consider obtaining molded earplugs from the audiology department to use while bathing or swimming.   Purchase inexpensive types that are most comfortable for you. You can make your own by using cotton balls mixed with a generous amount of Vaseline petroleum jelly. Gently place these in the ear canal.    > Dry the ear canal: after getting out of the shower or bath, use a hair dryer on low heat blowing in the ear for 10-15 seconds. Pulling gently backwards on the ear straightens the ear canal and allows the air to get further down.    > If you are to use ear drops, please place them in the ear canal and give them a few seconds to run down.  Follow this by blowing in the ear canal with a hair dryer set on low heat for approximately 10 to 15 seconds.  You may do this multiple times during the day to help keep the ear canal dry.    >If you are swimming frequently- place a drop of oil in each ear canal prior to entering water. After you are finished in the water, place a drop of vinegar in each ear canal. Use a hair dryer on low heat to blow in each ear canal for 10-15 seconds to dry ears out.    No drops for now    WATER PRECAUTIONS FOR EARS    Protecting your ears from water may sometimes be necessary for the health of your ears.     > Ear plugs: You may use earplugs: over the counter silicone plugs or a cotton ball coated with vasoline when bathing. If conservative measures are not working, consider obtaining molded earplugs from the audiology department to use while bathing or swimming.   Purchase inexpensive types that are most comfortable for you. You can make your own by using cotton balls mixed with a generous amount of Vaseline  petroleum jelly. Gently place these in the ear canal.    > Dry the ear canal: after getting out of the shower or bath, use a hair dryer on low heat blowing in the ear for 10-15 seconds. Pulling gently backwards on the ear straightens the ear canal and allows the air to get further down.    > If you are to use ear drops, please place them in the ear canal and give them a few seconds to run down.  Follow this by blowing in the ear canal with a hair dryer set on low heat for approximately 10 to 15 seconds.  You may do this multiple times during the day to help keep the ear canal dry.    >If you are swimming frequently- place a drop of oil in each ear canal prior to entering water. After you are finished in the water, place a drop of vinegar in each ear canal. Use a hair dryer on low heat to blow in each ear canal for 10-15 seconds to dry ears out.    NASAL SALINE:  Use 2 puffs each nostril 4-6 times daily and more frequently if possible.  You can buy saline spray or you can make your own and use an old spray bottle to administer  Use a humidifier at bedside  Recipe for saline:  Water                                 1 quart  Salt (table)                        1 tablespoon  Gylcerin (or Karina Syrup)    1 teaspoon  Sodium bicarbonate           1 teaspoon  Sprays or [place] pots are recommended    Afrin use:  Use 2 puffs each nostril 3 times daily for 3 days only!!  Stop using after 3 days unless instructed to use longer    Nasal steroid use:  Using nasal steroids:  You will be prescribed one of the following nasal steroids: Flonase, Nasacort, Nasonex, Rhinocort, Qnasl, Zetonna  2 puffs each nostril 2 times daily  Start as soon as possible    Use Afrin first and wait 10 minutes to allow the nose to open. Then administer nasal steroids.    Thank you for enrolling in [facility]. Please follow the instructions below to securely access your online medical record. [facility] allows you to send messages to your doctor, view your test  results, renew your prescriptions, schedule appointments, and more.    How Do I Sign Up?  1. In your Internet browser, go to [email or web address]  2. Click on the Sign Up Now link in the New User? box.   3. Enter your [employer] Activation Code exactly as it appears below. You will not need to use this code after you have completed the sign-up process. If you do not sign up before the expiration date, you must request a new code.  [employer] Activation Code: [email or web address]  Expires: 12/18/2020  1:36 PM    4. Enter the last four digits of your Social Security Number and your Date of Birth as indicated and click Next. You will be taken to the next sign-up page.  5. Create a [employer] username. Think of one that is secure and easy to remember.  6. Create a [employer] password. You can change your password at any time.  7. Choose a security question, enter your answer, and click Next. This can be used to access [employer] if you forget your password.   8. Select your communication preference. Enter a valid e-mail address if you would like to receive e-mail notifications when new information is available in [employer].  9. Click Sign In. You can now view your medical record.     Additional Information  If you have questions, you can e-mail [email or web address], or call [phone] to talk to our [employer] staff. Remember, [employer] is NOT to be used for urgent needs. For medical emergencies, dial 911.

## 2020-11-19 ENCOUNTER — OFFICE VISIT (OUTPATIENT)
Dept: PRIMARY CARE CLINIC | Age: 20
End: 2020-11-19
Payer: COMMERCIAL

## 2020-11-19 VITALS
SYSTOLIC BLOOD PRESSURE: 130 MMHG | TEMPERATURE: 98.5 F | DIASTOLIC BLOOD PRESSURE: 86 MMHG | HEART RATE: 74 BPM | WEIGHT: 171 LBS | RESPIRATION RATE: 16 BRPM | BODY MASS INDEX: 28.49 KG/M2 | HEIGHT: 65 IN | OXYGEN SATURATION: 98 %

## 2020-11-19 LAB
CONTROL: NORMAL
PREGNANCY TEST URINE, POC: NORMAL

## 2020-11-19 PROCEDURE — 81025 URINE PREGNANCY TEST: CPT | Performed by: FAMILY MEDICINE

## 2020-11-19 PROCEDURE — 99214 OFFICE O/P EST MOD 30 MIN: CPT | Performed by: FAMILY MEDICINE

## 2020-11-19 RX ORDER — OXYMETAZOLINE HYDROCHLORIDE 0.05 G/100ML
2 SPRAY NASAL 3 TIMES DAILY
COMMUNITY
Start: 2020-11-18 | End: 2020-11-21

## 2020-11-19 RX ORDER — FLUTICASONE PROPIONATE 50 MCG
2 SPRAY, SUSPENSION (ML) NASAL 2 TIMES DAILY
COMMUNITY
Start: 2020-11-18 | End: 2022-04-23

## 2020-11-19 RX ORDER — BUSPIRONE HYDROCHLORIDE 7.5 MG/1
TABLET ORAL
COMMUNITY
Start: 2020-09-17 | End: 2022-04-23

## 2020-11-19 ASSESSMENT — ENCOUNTER SYMPTOMS
EYE DISCHARGE: 0
NAUSEA: 0
BACK PAIN: 0
VOMITING: 0
COLOR CHANGE: 0
DIARRHEA: 0
ABDOMINAL PAIN: 0
COUGH: 0
WHEEZING: 0

## 2020-11-19 NOTE — PROGRESS NOTES
SUBJECTIVE:    Patient ID: Tran Curtis is a 21 y.o. female. HPI:   Presents today because she states that she is not having periods. She states it has been about 3 years since she has had a period. She states that she is not currently on anything for birth control. She was on Depo for about a year and finished this in March. She states that she was on a birth control pill for couple years prior to that. She is not sure which pill she was on. She states that she is not having any cramping. She has not seen gynecology in over a year. We did do a urine pregnancy today which was negative. She states that she has a hole in her ear which was diagnosed about a week ago. She is seeing ENT. She is scheduled to see them back next week for possible surgery. She states that she is having some drainage from it. It is her right ear. She states that it is not hurting. She states that she is also has a rash on her right thigh posteriorly. She states that she was seen in the ER and was possibly diagnosed with shingles. She states that it is not hurting or itching anymore but it did initially when she was first diagnosed. She states she would like to have it looked at because she states that the scar is still there. She was initially diagnosed between October 30 and November 1.     Past Medical History:   Diagnosis Date    Allergy     Environmental allergies       Current Outpatient Medications   Medication Sig Dispense Refill    busPIRone (BUSPAR) 7.5 MG tablet       fluticasone (FLONASE) 50 MCG/ACT nasal spray 2 sprays by Nasal route 2 times daily      oxymetazoline (AFRIN) 0.05 % nasal spray 2 sprays by Nasal route 3 times daily      amoxicillin-clavulanate (AUGMENTIN) 875-125 MG per tablet Take 1 tablet by mouth 2 times daily for 7 days 14 tablet 0    naproxen (NAPROXEN) 500 MG EC tablet Take 1 tablet by mouth 2 times daily (with meals) 60 tablet 0    escitalopram (LEXAPRO) 20 MG tablet Take 1 tablet by mouth daily 30 tablet 5     No current facility-administered medications for this visit. No Known Allergies    Review of Systems   Constitutional: Negative for activity change, appetite change and fever. HENT: Positive for ear discharge. Negative for congestion and nosebleeds. Eyes: Negative for discharge. Respiratory: Negative for cough and wheezing. Cardiovascular: Negative for chest pain and leg swelling. Gastrointestinal: Negative for abdominal pain, diarrhea, nausea and vomiting. Genitourinary: Positive for menstrual problem (amenorrhea). Negative for difficulty urinating, frequency and urgency. Musculoskeletal: Negative for back pain and gait problem. Skin: Positive for rash (posterior right leg with blisters that appear to be healing). Negative for color change. Neurological: Negative for dizziness and headaches. Hematological: Does not bruise/bleed easily. Psychiatric/Behavioral: Negative for sleep disturbance and suicidal ideas. OBJECTIVE:     Physical Exam  Vitals signs reviewed. Constitutional:       General: She is not in acute distress. Appearance: Normal appearance. She is well-developed. She is not diaphoretic. HENT:      Head: Normocephalic and atraumatic. Right Ear: External ear normal.      Left Ear: External ear normal.   Neck:      Musculoskeletal: Normal range of motion and neck supple. Cardiovascular:      Rate and Rhythm: Normal rate and regular rhythm. Pulses: Normal pulses. Heart sounds: Normal heart sounds. No murmur. Pulmonary:      Effort: Pulmonary effort is normal. No respiratory distress. Breath sounds: Normal breath sounds. Skin:     General: Skin is warm and dry. Neurological:      General: No focal deficit present. Mental Status: She is alert and oriented to person, place, and time. Mental status is at baseline.    Psychiatric:         Mood and Affect: Mood normal.         Behavior: Behavior normal.         Thought Content: Thought content normal.         Judgment: Judgment normal.        /86 (Site: Left Upper Arm, Position: Sitting, Cuff Size: Medium Adult)   Pulse 74   Temp 98.5 °F (36.9 °C) (Temporal)   Resp 16   Ht 5' 5\" (1.651 m)   Wt 171 lb (77.6 kg)   LMP  (LMP Unknown)   SpO2 98%   BMI 28.46 kg/m²      ASSESSMENT:    Андрей was seen today for amenorrhea. Diagnoses and all orders for this visit:    Amenorrhea  -     POCT urine pregnancy  -     [facility] [place] [facility], CNM, Gynecology, Alice Gonzalez    Herpes    Perforation of right tympanic membrane        PLAN:    Referral has been ordered for GYN for further evaluation of amenorrhea. I have encouraged her to let us know if she develops another rash. The one she hs now will likely just take time to clear. She has already done a round of valtrex. Follow up with ENT for ruptured Tm. EMR Dragon/transcription disclaimer:  Much of this encounter note is electronic transcription/translation of spoken language toprinted texts. The electronic translation of spoken language may be erroneous, or at times, nonsensical words or phrases may be inadvertently transcribed.   Although I have reviewed the note for such errors, some may stillexist.

## 2020-11-30 NOTE — PATIENT INSTRUCTIONS
Patient Education        Secondary Amenorrhea: Care Instructions  Your Care Instructions     Amenorrhea means you do not have menstrual periods. There are two types. Primary amenorrhea means you never start your periods. Secondary amenorrhea means you have had periods, and then they stop, especially for more than 3 months. Even if you don't have periods, you could still get pregnant. You may not know what caused your periods to stop. Possible causes include pregnancy, hormonal changes, and losing or gaining a lot of weight quickly. Some medicines and stress could also cause it. Being active in endurance sports can also cause you to miss your period or stop menstruating. Female athletes may try to lose or maintain weight in harmful ways. These include dieting too much or binging and purging. But doing these things can lead to eating disorders, amenorrhea, and osteoporosis. If you exercise less or gain a little weight, your periods will probably start again. Your doctor may order tests to find out why your periods have stopped. Your doctor may give you the hormone progestin. It can cause you to have a period. Talk to your doctor if you do not have a period for 3 months or more. Going for a long amount of time without a period can raise your chance of getting cancer of the lining of the uterus later in life. Follow-up care is a key part of your treatment and safety. Be sure to make and go to all appointments, and call your doctor if you are having problems. It's also a good idea to know your test results and keep a list of the medicines you take. How can you care for yourself at home? · Eat a healthy, balanced diet. This includes fruits, vegetables, whole grains, proteins, and low-fat dairy products. · Do light exercise, unless your doctor told you not to exercise. · Use birth control if you do not want to get pregnant. When should you call for help?    Call your doctor now or seek immediate medical care if:    · You have severe vaginal bleeding.     · You have new or worse belly or pelvic pain. Watch closely for changes in your health, and be sure to contact your doctor if:    · You have unusual vaginal bleeding.     · You think you might be pregnant.     · You do not get better as expected. Where can you learn more? Go to https://chpelori.health-partners. org and sign in to your [employer] account. Enter 53-69-10-18 in the [employer] box to learn more about \"Secondary Amenorrhea: Care Instructions. \"     If you do not have an account, please click on the \"Sign Up Now\" link. Current as of: November 8, 2019               Content Version: 12.6  © 9092-2602 [employer], "[employer]. Care instructions adapted under license by [facility] ([facility]). If you have questions about a medical condition or this instruction, always ask your healthcare professional. [address] any warranty or liability for your use of this information. Patient Education        Combination Birth Control Pills: Care Instructions  Your Care Instructions     Combination birth control pills are used to prevent pregnancy. They give you a regular dose of the hormones estrogen and progestin. You take a hormone pill every day to prevent pregnancy. Birth control pills come in packs. The most common type has 3 weeks of hormone pills. Some packs have sugar pills (they do not contain any hormones) for the fourth week. During that fourth no-hormone week, you have your period. After the fourth week (28 days), you start a new pack. Some birth control pills are packaged in different ways. For example, some have hormone pills for the fourth week instead of sugar pills. Taking hormones for the entire month causes you to not have periods or to have fewer periods. Others are packaged so that you have a period every 3 months. Your doctor will tell you what type of pills you have.   Follow-up care is a key part of your treatment and safety. Be sure to make and go to all appointments, and call your doctor if you are having problems. It's also a good idea to know your test results and keep a list of the medicines you take. How can you care for yourself at home? How do you take the pill? · Follow your doctor's instructions about when to start taking your pills. Use backup birth control, such as a condom, or don't have intercourse for 7 days after you start your pills. · Take your pills every day, at about the same time of day. To help yourself do this, try to take them when you do something else every day, such as brushing your teeth. What if you forget to take a pill? Always read the label for specific instructions, or call your doctor. Here are some basic guidelines:  · If you miss 1 hormone pill, take it as soon as you remember. Ask your doctor if you may need to use a backup birth control method, such as a condom, or not have intercourse. · If you miss 2 or more hormone pills, take one as soon as you remember you forgot them. Then read the pill label or call your doctor about instructions on how to take your missed pills. Use a backup method of birth control or don't have intercourse for 7 days. Pregnancy is more likely if you miss more than 1 pill. · If you had intercourse, you can use emergency contraception to help prevent pregnancy. The most effective emergency contraception is the copper IUD (inserted by a doctor). You can also get emergency contraceptive pills without a prescription at most drugstores. What else do you need to know? · The pill can have side effects. ? You may have very light or skipped periods. ? You may have bleeding between periods (spotting). This usually decreases after 3 to 4 months. ? You may have mood changes, less interest in sex, or weight gain. · The pill may reduce acne, heavy bleeding and cramping, and symptoms of premenstrual syndrome.   · Check with your doctor before you use any other medicines, including over-the-counter medicines, vitamins, herbal products, and supplements. Birth control hormones may not work as well to prevent pregnancy when combined with other medicines. · The pill doesn't protect against sexually transmitted infection (STIs), such as herpes or HIV/AIDS. If you're not sure whether your sex partner might have an STI, use a condom to protect against disease. When should you call for help? Call your doctor now or seek immediate medical care if:    · You have severe belly pain.     · You have signs of a blood clot, such as:  ? Pain in your calf, back of the knee, thigh, or groin. ? Redness and swelling in your leg or groin.     · You have blurred vision or other problems seeing.     · You have a severe headache.     · You have severe trouble breathing. Watch closely for changes in your health, and be sure to contact your doctor if:    · You think you might be pregnant.     · You think you may be depressed.     · You think you may have been exposed to or have a sexually transmitted infection. Where can you learn more? Go to https://FjuulpekimBondora (by is[employer]Pankur)eb.healthAdInnovation. org and sign in to your [employer] account. Enter E134 in the [facility] box to learn more about \"Combination Birth Control Pills: Care Instructions. \"     If you do not have an account, please click on the \"Sign Up Now\" link. Current as of: February 11, 2020               Content Version: 12.6  © 2762-4222 [employer]. Care instructions adapted under license by [facility] ([facility]). If you have questions about a medical condition or this instruction, always ask your healthcare professional. [address] any warranty or liability for your use of this information.

## 2020-12-01 ENCOUNTER — TELEMEDICINE (OUTPATIENT)
Dept: OBGYN | Age: 20
End: 2020-12-01
Payer: COMMERCIAL

## 2020-12-01 PROCEDURE — G2012 BRIEF CHECK IN BY MD/QHP: HCPCS | Performed by: ADVANCED PRACTICE MIDWIFE

## 2020-12-01 RX ORDER — NORGESTIMATE AND ETHINYL ESTRADIOL 0.25-0.035
1 KIT ORAL DAILY
Qty: 1 PACKET | Refills: 3 | Status: SHIPPED | OUTPATIENT
Start: 2020-12-01 | End: 2022-04-23

## 2020-12-01 ASSESSMENT — ENCOUNTER SYMPTOMS
EYES NEGATIVE: 1
ALLERGIC/IMMUNOLOGIC NEGATIVE: 1
GASTROINTESTINAL NEGATIVE: 1
RESPIRATORY NEGATIVE: 1

## 2020-12-01 NOTE — PROGRESS NOTES
[facility]  CNM Office Note  TELEHEALTH EVALUATION -- Audio/Visual (During PYDHE-79 public health emergency)    Rock Webber is a [address] y.o. female who presents today for her medical conditions/ complaints as noted below. No chief complaint on file. HPI  Андрей connected via phone to discuss lack of menstrual cycle. She states she stopped DMPA in March and has not had period since that time and is concerned. She would like to have periods. No other complaints. Patient Active Problem List   Diagnosis    Yeast infection    Situational depression       No LMP recorded (lmp unknown). (Menstrual status: Irregular periods). No obstetric history on file. Past Medical History:   Diagnosis Date    Allergy     Environmental allergies      Past Surgical History:   Procedure Laterality Date    ADENOIDECTOMY  07-        TONSILLECTOMY  07-    Dr Alivia Elizondo TYMPANOSTOMY TUBE PLACEMENT       Family History   Problem Relation Age of Onset    Hypertension Mother      Social History     Tobacco Use    Smoking status: Never Smoker    Smokeless tobacco: Never Used   Substance Use Topics    Alcohol use: No       Current Outpatient Medications   Medication Sig Dispense Refill    busPIRone (BUSPAR) 7.5 MG tablet       fluticasone (FLONASE) 50 MCG/ACT nasal spray 2 sprays by Nasal route 2 times daily      naproxen (NAPROXEN) 500 MG EC tablet Take 1 tablet by mouth 2 times daily (with meals) 60 tablet 0    escitalopram (LEXAPRO) 20 MG tablet Take 1 tablet by mouth daily 30 tablet 5     No current facility-administered medications for this visit. No Known Allergies  There were no vitals filed for this visit. There is no height or weight on file to calculate BMI. Review of Systems   Constitutional: Negative. HENT: Negative. Eyes: Negative. Respiratory: Negative. Cardiovascular: Negative. Gastrointestinal: Negative. Endocrine: Negative. Genitourinary: Positive for menstrual problem. Musculoskeletal: Negative. Skin: Negative. Allergic/Immunologic: Negative. Neurological: Negative. Hematological: Negative. Psychiatric/Behavioral: Negative. Due to this being a TeleHealth encounter, evaluation of the following organ systems is limited: Vitals/Constitutional/EENT/Resp/CV/GI//MS/Neuro/Skin/Heme-Lymph-Imm. Physical Exam  Pulmonary:      Effort: Pulmonary effort is normal.   Neurological:      Mental Status: She is alert. Psychiatric:         Mood and Affect: Mood normal.         Thought Content: Thought content normal.         Judgment: Judgment normal.          Diagnosis Orders   1. Amenorrhea  HCG, Quantitative, Pregnancy       MEDICATIONS:  No orders of the defined types were placed in this encounter. ORDERS:  No orders of the defined types were placed in this encounter. PLAN:  1. Reassurance given as this can be normal with DMPA. 2. UPT suggested and if negative, can start Sprintec in hopes of initiating a more normal withdrawal bleed. Risks, benefits, and alternatives were discussed with Андрей today concerning contraception choices. No contraindications were noted. ACHES (abdominal pain, chest pain, headaches, visual changes, or severe leg pain) were reviewed with the patient and I stressed the importance of stopping the medication and notifying the provider if these occurred. I also educated the patient on menstrual irregularity associated with OCP initiation and/or continuation. Pursuant to the emergency declaration under the [facility], [facility] waiver authority and the [employer] Act, this Virtual  Visit was conducted, with patient's consent, to reduce the patient's risk of exposure to COVID-19 and provide continuity of care for an established patient.     Services were provided through a video synchronous discussion virtually to substitute for in-person clinic visit.

## 2020-12-02 ENCOUNTER — OFFICE VISIT (OUTPATIENT)
Dept: OTOLARYNGOLOGY | Facility: CLINIC | Age: 20
End: 2020-12-02

## 2020-12-02 DIAGNOSIS — J34.3 NASAL TURBINATE HYPERTROPHY: ICD-10-CM

## 2020-12-02 DIAGNOSIS — H90.11 CONDUCTIVE HEARING LOSS OF RIGHT EAR WITH UNRESTRICTED HEARING OF LEFT EAR: ICD-10-CM

## 2020-12-02 DIAGNOSIS — H72.91 PERFORATED TYMPANIC MEMBRANE ON EXAMINATION, RIGHT: Primary | ICD-10-CM

## 2020-12-02 DIAGNOSIS — H92.11 OTORRHEA, RIGHT: ICD-10-CM

## 2020-12-02 PROCEDURE — 69610 TYMPANIC MEMBRANE REPAIR: CPT | Performed by: OTOLARYNGOLOGY

## 2020-12-02 RX ORDER — NORGESTIMATE AND ETHINYL ESTRADIOL 0.25-0.035
1 KIT ORAL
COMMUNITY
Start: 2020-12-01

## 2020-12-02 NOTE — PATIENT INSTRUCTIONS
WATER PRECAUTIONS FOR EARS    Protecting your ears from water may sometimes be necessary for the health of your ears.     > Ear plugs: You may use earplugs: over the counter silicone plugs or a cotton ball coated with vasoline when bathing. If conservative measures are not working, consider obtaining molded earplugs from the audiology department to use while bathing or swimming.   Purchase inexpensive types that are most comfortable for you. You can make your own by using cotton balls mixed with a generous amount of Vaseline petroleum jelly. Gently place these in the ear canal.    > Dry the ear canal: after getting out of the shower or bath, use a hair dryer on low heat blowing in the ear for 10-15 seconds. Pulling gently backwards on the ear straightens the ear canal and allows the air to get further down.    > If you are to use ear drops, please place them in the ear canal and give them a few seconds to run down.  Follow this by blowing in the ear canal with a hair dryer set on low heat for approximately 10 to 15 seconds.  You may do this multiple times during the day to help keep the ear canal dry.    >If you are swimming frequently- place a drop of oil in each ear canal prior to entering water. After you are finished in the water, place a drop of vinegar in each ear canal. Use a hair dryer on low heat to blow in each ear canal for 10-15 seconds to dry ears out.    Thank you for enrolling in [facility]. Please follow the instructions below to securely access your online medical record. [facility] allows you to send messages to your doctor, view your test results, renew your prescriptions, schedule appointments, and more.    How Do I Sign Up?  1. In your Internet browser, go to [employer]  2. Click on the Sign Up Now link in the New User? box.   3. Enter your [employer] [facility] Activation Code exactly as it appears below. You will not need to use this code after you have completed the sign-up process. If you do not  sign up before the expiration date, you must request a new code.  [employer] Activation Code: [email or web address]  Expires: 12/18/2020  1:36 PM    4. Enter the last four digits of your Social Security Number and your Date of Birth as indicated and click Next. You will be taken to the next sign-up page.  5. Create a [employer] username. Think of one that is secure and easy to remember.  6. Create a [facility] password. You can change your password at any time.  7. Choose a security question, enter your answer, and click Next. This can be used to access [employer] if you forget your password.   8. Select your communication preference. Enter a valid e-mail address if you would like to receive e-mail notifications when new information is available in [facility].  9. Click Sign In. You can now view your medical record.     Additional Information  If you have questions, you can e-mail [email or web address], or call [phone] to talk to our [employer] staff. Remember, [employer] is NOT to be used for urgent needs. For medical emergencies, dial 911.

## 2020-12-02 NOTE — PROGRESS NOTES
ENT PROCEDURE NOTE:  Anesthesia: topical phenol    Procedure:  Myringoplasty right Paper    Procedure Details:    The patient was placed supine on the procedure table. Using a speculum, the ear was examined with the microscope. The tympanic membrane was visualized.  The perforation was cleaned and assessed.  Using paper, the graft was cut to size and placed over the lateral surface of the tympanic membrane to cover the perforation.  Ointment was then placed against the graft to hold it up to the lateral surface of the tympanic membrane    Findings:   Ear Canal:     RIGHT: External auditory canal normal and intact  Tympanic Membrane:     RIGHT: Anterior 25% perforation from the umbo to the anterior part of the tympanic membrane dry  Ossicular Chain:     RIGHT: Intact  Middle ear space:     RIGHT: Clear today    Condition:  Stable.  Patient tolerated procedure well.    Complications:  None  Post-procedure instructions reviewed with Patient, Mother  Ear Instructions documented in AVS for patient to review

## 2020-12-02 NOTE — PROGRESS NOTES
Deandre Wilcox Jr, MD     Chief Complaint   Patient presents with   • Follow-up          HPI   Accompanied by: Mother  Valerie Mon is a 20 y.o. female who is here for follow up. She has had no change in her tympanic membrane perforation.  She wishes to proceed with a in office myringoplasty to the right tympanic membrane.  She has had no further drainage.  She does notice the hearing loss on the right side.    Review of Systems   Constitutional: Negative for appetite change, fatigue and fever.   HENT:        See HPI   Respiratory: Negative for cough, choking and shortness of breath.    Gastrointestinal: Negative for diarrhea, nausea and vomiting.   Skin: Negative for rash.   Neurological: Negative for dizziness, light-headedness and headaches.   Psychiatric/Behavioral: Negative for sleep disturbance.     I have reviewed the ROS as documented by the MA/LPN/RN Deandre Wilcox Jr, MD      History     Last Reviewed by Deandre Wilcox Jr., MD on 12/2/2020 at  4:32 PM    Sections Reviewed    Medical, Family, Tobacco, Surgical      Problem list reviewed by Deandre Wilcox Jr., MD on 12/2/2020 at  4:32 PM  Medicines reviewed by Deandre Wilcox Jr., MD on 12/2/2020 at  4:32 PM  Allergies reviewed by Deandre Wilcox Jr., MD on 12/2/2020 at  4:32 PM         Vital Signs:        Physical Exam  CONSTITUTIONAL:    well nourished, well-developed, alert, oriented, in no acute distress      BODY HABITUS:    Normal body habitus     COMMUNICATION:    able to communicate normally, normal voice quality     HEAD:     Normocephalic, without obvious abnormality, atraumatic     FACE:    structure normal, no tenderness present, no lesions/masses, no evidence of trauma     SALIVARY GLANDS:    parotid glands with no tenderness, no swelling, no masses, submandibular glands with normal size, nontender      EYE:    ocular motility normal, eyelids normal, orbits normal, no proptosis, conjunctiva clear, sclera  non-icteric, pupils equal, round, reactive to light and accomodation  Color:   brown     HEARING:    response to conversational voice normal     EARS:    Otoscopic exam      Bilateral  Left  normal pinna with no lesions, Canals normal size and shape, Tympanic membranes normal, Ossicular chain intact, Middle ear clear   EXTERNAL AUDITORY CANALS:   Right    normal ear canals without stenosis or significant cerumen, skin intact and normal shape   TYMPANIC MEMBRANES:     Abnormal tympanic membrane:        RIGHT: Slightly thickened, slightly dull, 25% perforation anterior to the mid and upper portion of the long process of the malleus with well-healed edges   MIDDLE EAR:     Abnormal:       RIGHT: Mildly thick, violaceous, nongranulated, no otorrhea    Ossicular chain:      intact:  Right   MASTOID:   Right    Non-surgical, Non-tender    PINNA:   Right    normal, non-tender, skin intact without lesions       AS:      AD:      NOSE EXTERNAL:    APPEARANCE: normal, straight, with good projection, no tenderness, no lesions, no tenderness, good nasal support, patent nares     NOSE INTERNAL:    Anterior rhinoscopy   NASALMUCOSA:    abnormal:        Bilateral- Bluish boggy and thick    NASAL PASSAGES:     abnormal   Bilateral- Markedly narrowed    NASAL VALVE:     intact, good support and no nasal obstruction   SEPTUM:     mucosa abnormal   Bilateral- Bluish, boggy     midline   INFERIOR TURBINATES:     abnormal  Bilateral- Bluish, very enlarged and obstructing, boggy      ORAL CAVITY:    Normal lips with no lesions, dentition normal for age, FOM intact without lesions and normal salivary flow, Mucosa intact without lesions, Hard and soft palate normal without lesions     OROPHARYNX:    Direct examination  oropharyngeal mucosa normal, tonsil fossa(e) with normal appearance       NECK:    normal appearance, no masses, no lesions, larynx normal mobility, trachea midline     LYMPH NODES :    no adenopathy     THYROID:    no overt  thyromegaly, no tenderness, nodules or mass present on palpation, position midline     CHEST/RESPIRATORY:    respiratory effort normal, no rales, rubs or wheezing, no stridor, normal appearance to chest     CARDIOVASCULAR:    regular rate and rhythm, no murmurs, gallups, no peripheral edema     NEURO/PSYCHIATRIC :    oriented appropriately for age, mood normal, affect appropriate, cranial nerves intact grossly (unless specifically described), gait normal for age      RESULTS REVIEW:    I have reviewed the patients old records in the chart.  Old audiologic testing was reviewed.            Assessment:       1. Perforated tympanic membrane on examination, right    2. Conductive hearing loss of right ear with unrestricted hearing of left ear    3. Otorrhea, right    4. Nasal turbinate hypertrophy            Plan:      Medical and surgical options were discussed including observation, continued medical management, medication modification and surgical management. Risks, benefits and alternatives were discussed and questions were answered. After considering the options, the patient decided to proceed with surgical management.   Patient has had myringoplasty in the office on the right ear.  Been given postoperative instructions for this procedure.  Follow the patient up in 3 weeks to see if the perforation has closed.  I did discuss with her the possibility of a surgical tympanoplasty to repair this perforation.   Water precautions  Do not blow nose       Return in about 3 weeks (around 12/23/2020) for Recheck R ear.            Deandre Wilcox Jr, MD  12/02/20  17:07 CST

## 2020-12-23 ENCOUNTER — OFFICE VISIT (OUTPATIENT)
Dept: OTOLARYNGOLOGY | Facility: CLINIC | Age: 20
End: 2020-12-23

## 2020-12-23 DIAGNOSIS — J34.3 NASAL TURBINATE HYPERTROPHY: ICD-10-CM

## 2020-12-23 DIAGNOSIS — H72.91 PERFORATED TYMPANIC MEMBRANE ON EXAMINATION, RIGHT: Primary | ICD-10-CM

## 2020-12-23 DIAGNOSIS — H90.11 CONDUCTIVE HEARING LOSS OF RIGHT EAR WITH UNRESTRICTED HEARING OF LEFT EAR: ICD-10-CM

## 2020-12-23 DIAGNOSIS — H92.11 OTORRHEA, RIGHT: ICD-10-CM

## 2020-12-23 PROCEDURE — 92504 EAR MICROSCOPY EXAMINATION: CPT | Performed by: OTOLARYNGOLOGY

## 2020-12-23 PROCEDURE — 99213 OFFICE O/P EST LOW 20 MIN: CPT | Performed by: OTOLARYNGOLOGY

## 2020-12-23 NOTE — PATIENT INSTRUCTIONS
WATER PRECAUTIONS FOR EARS    Protecting your ears from water may sometimes be necessary for the health of your ears.     > Ear plugs: You may use earplugs: over the counter silicone plugs or a cotton ball coated with vasoline when bathing. If conservative measures are not working, consider obtaining molded earplugs from the audiology department to use while bathing or swimming.   Purchase inexpensive types that are most comfortable for you. You can make your own by using cotton balls mixed with a generous amount of Vaseline petroleum jelly. Gently place these in the ear canal.    > Dry the ear canal: after getting out of the shower or bath, use a hair dryer on low heat blowing in the ear for 10-15 seconds. Pulling gently backwards on the ear straightens the ear canal and allows the air to get further down.    > If you are to use ear drops, please place them in the ear canal and give them a few seconds to run down.  Follow this by blowing in the ear canal with a hair dryer set on low heat for approximately 10 to 15 seconds.  You may do this multiple times during the day to help keep the ear canal dry.    >If you are swimming frequently- place a drop of oil in each ear canal prior to entering water. After you are finished in the water, place a drop of vinegar in each ear canal. Use a hair dryer on low heat to blow in each ear canal for 10-15 seconds to dry ears out.    Thank you for enrolling in [employer]. Please follow the instructions below to securely access your online medical record. [employer] allows you to send messages to your doctor, view your test results, renew your prescriptions, schedule appointments, and more.    How Do I Sign Up?  1. In your Internet browser, go to [employer]  2. Click on the Sign Up Now link in the New User? box.   3. Enter your [employer] Activation Code exactly as it appears below. You will not need to use this code after you have completed the sign-up process. If you do not  sign up before the expiration date, you must request a new code.  "[employer] Activation Code: [email or web address]  Expires: 1/22/2021  4:45 PM    4. Enter the last four digits of your Social Security Number and your Date of Birth as indicated and click Next. You will be taken to the next sign-up page.  5. Create a "[employer] username. Think of one that is secure and easy to remember.  6. Create a "[employer] password. You can change your password at any time.  7. Choose a security question, enter your answer, and click Next. This can be used to access "[employer] if you forget your password.   8. Select your communication preference. Enter a valid e-mail address if you would like to receive e-mail notifications when new information is available in "[employer].  9. Click Sign In. You can now view your medical record.     Additional Information  If you have questions, you can e-mail [email or web address], or call [phone] to talk to our "[employer] staff. Remember, "[employer] is NOT to be used for urgent needs. For medical emergencies, dial 911.

## 2020-12-23 NOTE — PROGRESS NOTES
Deandre Wilcox Jr, MD     ENT FOLLOW UP NOTE     Chief Complaint   Patient presents with   • Follow-up        HISTORY OF PRESENT ILLNESS:  Accompanied by: No one  Susie Mon is a  20 y.o. female who is here for follow up.  1since last visit, the patient has blown her nose and thinks she has blown off the paper patch.  She has had no ear drainage.  She has worsening of her hearing since the paper patch came off.    Review of Systems   Constitutional: Negative for appetite change, fatigue and fever.   HENT:        See HPI   Respiratory: Negative for cough, choking and shortness of breath.    Gastrointestinal: Negative for diarrhea, nausea and vomiting.   Skin: Negative for rash.   Neurological: Negative for dizziness, light-headedness and headaches.   Psychiatric/Behavioral: Negative for sleep disturbance.     Reviewed per patient intake note and confirmed by me    Past History:  Past medical and surgical history, family history and social history reviewed and updated when appropriate.  Current medications and allergies reviewed and updated when appropriate.  Allergies:  Patient has no known allergies.        Vital Signs:      EXAMINATION:  Ears-see micro exam  Constitutional-well-developed well-nourished, in no acute distress  Nose-mildly enlarged inferior turbinates, mildly inflamed, remainder of nasal exam is unremarkable  Oral cavity-normal dentition, normal oral mucosa, floor mouth normal with intact [place]'s duct, tongue intact and normal with normal mobility    RESULTS REVIEW:    No reports available for review           ASSESSMENT:      Diagnosis Plan   1. Perforated tympanic membrane on examination, right      Smaller than prior examination, still healing approximately 9 to 10% size now   2. Conductive hearing loss of right ear with unrestricted hearing of left ear      Because of tympanic membrane perforation   3. Otorrhea, right      Solved   4. Nasal turbinate hypertrophy      Mild  improvement           PLAN:      Continue current management plan.  Patient has had paper patch come off prematurely.  She has had some closure of the tympanic membrane.  I will continue to follow this for now and see if it closes further.  I have discussed options with her including repaper patch or full tympanoplasty.  Water precautions  MY CHART:  Patient is Encouraged to enroll in My Chart  Encouraged to review data and findings in My Chart          Patient understand(s) and agree(s) with the treatment plan as described.    Return in about 6 weeks (around 2/3/2021) for Recheck R ear.     Deandre Wilcox Jr, MD  12/23/20  16:44 CST

## 2020-12-23 NOTE — PROGRESS NOTES
ENT PROCEDURE NOTE  Anesthesia: none    Diagnosis:   No diagnosis found.     Procedure:  Ear microscopy bilateral    Procedure Details:    The patient was placed supine on the procedure table. Using a speculum, the ear was examined with the microscope. Using micro-instrumentation, the ear canal was cleared  The  Middle ear was carefully aspirated on the right side through the tympanic membrane perforation    Findings:   Ear Canal:   BILATERAL: Normal ear canal skin, normal shape  Tympanic Membrane:   LEFT: Intact and normal  RIGHT: Perforation present anterior that appears to be central and somewhat smaller than prior examination.  Edges of perforation were carefully elevated to prevent curling inward and aligned.  Ossicular Chain:   BILATERAL: Intact  Middle ear space:   LEFT: No fluid  RIGHT: Clean and dry, visualized thru perforation    Condition:  Stable.  Patient tolerated procedure well.    Complications:  None    Post-procedure instructions reviewed with Patient  Ear instructions

## 2021-01-29 ENCOUNTER — TELEPHONE (OUTPATIENT)
Dept: OTOLARYNGOLOGY | Facility: CLINIC | Age: 21
End: 2021-01-29

## 2021-02-02 ENCOUNTER — OFFICE VISIT (OUTPATIENT)
Dept: OTOLARYNGOLOGY | Facility: CLINIC | Age: 21
End: 2021-02-02

## 2021-02-02 DIAGNOSIS — H90.11 CONDUCTIVE HEARING LOSS OF RIGHT EAR WITH UNRESTRICTED HEARING OF LEFT EAR: ICD-10-CM

## 2021-02-02 DIAGNOSIS — H72.91 PERFORATED TYMPANIC MEMBRANE ON EXAMINATION, RIGHT: Primary | ICD-10-CM

## 2021-02-02 PROCEDURE — 99213 OFFICE O/P EST LOW 20 MIN: CPT | Performed by: OTOLARYNGOLOGY

## 2021-02-02 PROCEDURE — 69210 REMOVE IMPACTED EAR WAX UNI: CPT | Performed by: OTOLARYNGOLOGY

## 2021-02-02 NOTE — PROGRESS NOTES
ENT PROCEDURE NOTE  Anesthesia: none     Diagnosis:   No diagnosis found.      Procedure:  Ear microscopy bilateral     Procedure Details:    The patient was placed supine on the procedure table. Using a speculum, the ear was examined with the microscope. Using micro-instrumentation, the ear canal was cleared  The  Middle ear was examined through the tympanic membrane perforation.     Findings:   Ear Canal:   BILATERAL: Normal ear canal skin, normal shape, cerumen debrided  Tympanic Membrane:   LEFT: Intact and normal  RIGHT: Perforation present anterior that appears to be central and somewhat smaller than prior examination.   Old paper patch removed from anterior sulcus, edge of perforation gently debrided with the suction to remove mild squamous debris and to elicit healing  Ossicular Chain:   Intact  Middle ear space:   RIGHT: Clean and dry, visualized thru perforation    AS: after suction of cerumen       Condition:  Stable.  Patient tolerated procedure well.     Complications:  None     Post-procedure instructions reviewed with Patient and mother

## 2021-02-02 NOTE — PROGRESS NOTES
[facility] OTOLARYNGOLOGY, HEAD AND NECK SURGERY CLINIC NOTE    Chief Complaint   Patient presents with   • Follow-up          HPI   Follow up  Accompanied by:  Mother  Susie Mon is a 20 y.o. female who presents for follow up with no acute complaints. She has had no ear issues. She feels hearing is better. No drainage.      History     Last Reviewed by Deandre Wilcox Jr., MD on 2/2/2021 at  4:27 PM    Sections Reviewed    Medical, Family, Tobacco, Surgical      Problem list reviewed by Deandre Wilcox Jr., MD on 2/2/2021 at  4:27 PM  Medicines reviewed by Deandre Wilcox Jr., MD on 2/2/2021 at  4:27 PM  Allergies reviewed by Deandre Wilcox Jr., MD on 2/2/2021 at  4:27 PM         Vital Signs:        Physical Exam  Ears-  AS-tympanic membranes intact and normal.  Ear canal-normal shape, normal cerumen, normal skin  Middle ear-no fluid  Ossicular chain-intact    Constitutional-well-developed well-nourished, in no acute distress  Nose-mildly enlarged inferior turbinates, mildly inflamed, remainder of nasal exam is unremarkable  Oral cavity-normal dentition, normal oral mucosa, floor mouth normal with intact Gregorio's duct, tongue intact and normal with normal mobility    Physical examination has been copied from prior note.  This is been carefully reviewed and appropriate changes have been made in the documentation.      Sn[employer]ot   Notes   Encounters  Labs   Imaging   Meds   Media   Rslt Rev   :23}    Result Review    RESULTS REVIEW:    I have reviewed the patients old records in the chart.            Assessment:        Diagnosis Plan   1. Perforated tympanic membrane on examination, right     2. Conductive hearing loss of right ear with unrestricted hearing of left ear                Plan:        Continue current management plan.  Patient appears to have a slowly closing central anterior perforation of the tympanic membrane.  I will watch this for another 6 weeks.   If she fails to close completely, then I will consider formal tympanoplasty.  I have discussed options with the patient and her mother.  She will consider surgery.  Water precautions  Consider surgery for perforation             MY CHART:  Patient is Encouraged to enroll in My Chart  Encouraged to review data and findings in My Chart    Patient, Mother understand(s) and agree(s) with the treatment plan as described.    Return in about 6 weeks (around 3/16/2021) for Recheck ear.            Deandre Wilcox Jr, MD  02/02/21  16:30 CST

## 2021-02-02 NOTE — PATIENT INSTRUCTIONS
WATER PRECAUTIONS FOR EARS    Protecting your ears from water may sometimes be necessary for the health of your ears.     > Ear plugs: You may use earplugs: over the counter silicone plugs or a cotton ball coated with vasoline when bathing. If conservative measures are not working, consider obtaining molded earplugs from the audiology department to use while bathing or swimming.   Purchase inexpensive types that are most comfortable for you. You can make your own by using cotton balls mixed with a generous amount of Vaseline petroleum jelly. Gently place these in the ear canal.    > Dry the ear canal: after getting out of the shower or bath, use a hair dryer on low heat blowing in the ear for 10-15 seconds. Pulling gently backwards on the ear straightens the ear canal and allows the air to get further down.    > If you are to use ear drops, please place them in the ear canal and give them a few seconds to run down.  Follow this by blowing in the ear canal with a hair dryer set on low heat for approximately 10 to 15 seconds.  You may do this multiple times during the day to help keep the ear canal dry.    >If you are swimming frequently- place a drop of oil in each ear canal prior to entering water. After you are finished in the water, place a drop of vinegar in each ear canal. Use a hair dryer on low heat to blow in each ear canal for 10-15 seconds to dry ears out.    Thank you for enrolling in [email or web address]. Please follow the instructions below to securely access your online medical record. [email or web address] allows you to send messages to your doctor, view your test results, renew your prescriptions, schedule appointments, and more.    How Do I Sign Up?  1. In your Internet browser, go to [employer]  2. Click on the Sign Up Now link in the New User? box.   3. Enter your [email or web address] Activation Code exactly as it appears below. You will not need to use this code after you have completed the sign-up process. If you do not  sign up before the expiration date, you must request a new code.  [employer] Activation Code: [email or web address]  Expires: 3/4/2021  4:28 PM    4. Enter the last four digits of your Social Security Number and your Date of Birth as indicated and click Next. You will be taken to the next sign-up page.  5. Create a [employer] username. Think of one that is secure and easy to remember.  6. Create a [employer] password. You can change your password at any time.  7. Choose a security question, enter your answer, and click Next. This can be used to access [employer] if you forget your password.   8. Select your communication preference. Enter a valid e-mail address if you would like to receive e-mail notifications when new information is available in [employer].  9. Click Sign In. You can now view your medical record.     Additional Information  If you have questions, you can e-mail [email or web address], or call [phone] to talk to our [employer] staff. Remember, [employer] is NOT to be used for urgent needs. For medical emergencies, dial 911.

## 2021-03-29 ENCOUNTER — OFFICE VISIT (OUTPATIENT)
Dept: OTOLARYNGOLOGY | Facility: CLINIC | Age: 21
End: 2021-03-29

## 2021-03-29 VITALS — WEIGHT: 170 LBS | HEIGHT: 65 IN | BODY MASS INDEX: 28.32 KG/M2

## 2021-03-29 DIAGNOSIS — J34.3 NASAL TURBINATE HYPERTROPHY: ICD-10-CM

## 2021-03-29 DIAGNOSIS — H92.11 OTORRHEA, RIGHT: ICD-10-CM

## 2021-03-29 DIAGNOSIS — H90.11 CONDUCTIVE HEARING LOSS OF RIGHT EAR WITH UNRESTRICTED HEARING OF LEFT EAR: ICD-10-CM

## 2021-03-29 DIAGNOSIS — H72.91 PERFORATED TYMPANIC MEMBRANE ON EXAMINATION, RIGHT: Primary | ICD-10-CM

## 2021-03-29 PROCEDURE — 99213 OFFICE O/P EST LOW 20 MIN: CPT | Performed by: OTOLARYNGOLOGY

## 2021-03-29 NOTE — PROGRESS NOTES
[facility] OTOLARYNGOLOGY, HEAD AND NECK SURGERY CLINIC NOTE    Chief Complaint   Patient presents with   • Follow-up     6 week follow-up          HPI   Follow up  Accompanied by:  No one  RituAlicia Mon is a 21 y.o. female who is here for follow up. She has had ear crawling sensation.      History     Last Reviewed by Deandre Wilcox Jr., MD on 3/29/2021 at  2:43 PM    Sections Reviewed    Medical, Family, Tobacco, Surgical      Problem list reviewed by Deandre Wilcox Jr., MD on 3/29/2021 at  2:43 PM  Medicines reviewed by Deandre Wilcox Jr., MD on 3/29/2021 at  2:43 PM  Allergies reviewed by Deandre Wilcox Jr., MD on 3/29/2021 at  2:43 PM         Vital Signs:        Physical Exam  Vitals reviewed.   Constitutional:       Appearance: Normal appearance. She is well-developed and normal weight.   HENT:      Head: Normocephalic and atraumatic.      Right Ear: Hearing, tympanic membrane, ear canal and external ear normal.      Left Ear: Ear canal and external ear normal. Tympanic membrane is perforated.      Ears:        Nose: Nose normal.   Eyes:      Extraocular Movements: Extraocular movements intact.      Conjunctiva/sclera: Conjunctivae normal.   Pulmonary:      Effort: Pulmonary effort is normal. No respiratory distress.      Breath sounds: No stridor.   Musculoskeletal:         General: Normal range of motion.      Cervical back: Normal range of motion.   Skin:     Findings: No rash.   Neurological:      General: No focal deficit present.      Mental Status: She is alert.      Cranial Nerves: No cranial nerve deficit.   Psychiatric:         Behavior: Behavior normal. Behavior is cooperative.         Thought Content: Thought content normal.         Judgment: Judgment normal.     AD          SnapShot   Notes   Encounters  Labs   Imaging   Loterity   Media   Rslt Rev   :23}    Result Review    RESULTS REVIEW:    I have reviewed the patients old records in the chart.            Assessment:         Diagnosis Plan   1. Perforated tympanic membrane on examination, right      Small anterior superior quadrant, 10%   2. Conductive hearing loss of right ear with unrestricted hearing of left ear      Mild   3. Otorrhea, right      Resolved   4. Nasal turbinate hypertrophy                Plan:        Medical and surgical options were discussed including observation and surgical management. Risks, benefits and alternatives were discussed and questions were answered. After considering the options, the patient decided to proceed with observation.  Patient has a smaller but stable perforation of the right tympanic membrane.  She has had no drainage.  She will consider a more formal tympanoplasty later.  She is satisfied at this point time with the closure of her tympanic membrane perforation.  Water precautions  Hair dryer             MY CHART:  Patient is Encouraged to enroll in My Chart  Encouraged to review data and findings in My Chart    Patient understand(s) and agree(s) with the treatment plan as described.    Return in about 6 months (around 9/29/2021) for Recheck R ear perf.            Deandre Wilcox Jr, MD  03/29/21  14:44 CDT

## 2021-03-29 NOTE — PATIENT INSTRUCTIONS
WATER PRECAUTIONS FOR EARS    Protecting your ears from water may sometimes be necessary for the health of your ears.     > Ear plugs: You may use earplugs: over the counter silicone plugs or a cotton ball coated with vasoline when bathing. If conservative measures are not working, consider obtaining molded earplugs from the audiology department to use while bathing or swimming.   Purchase inexpensive types that are most comfortable for you. You can make your own by using cotton balls mixed with a generous amount of Vaseline petroleum jelly. Gently place these in the ear canal.    > Dry the ear canal: after getting out of the shower or bath, use a hair dryer on low heat blowing in the ear for 10-15 seconds. Pulling gently backwards on the ear straightens the ear canal and allows the air to get further down.    > If you are to use ear drops, please place them in the ear canal and give them a few seconds to run down.  Follow this by blowing in the ear canal with a hair dryer set on low heat for approximately 10 to 15 seconds.  You may do this multiple times during the day to help keep the ear canal dry.    >If you are swimming frequently- place a drop of oil in each ear canal prior to entering water. After you are finished in the water, place a drop of vinegar in each ear canal. Use a hair dryer on low heat to blow in each ear canal for 10-15 seconds to dry ears out.      CONTACT INFORMATION:  The main office phone number is [phone]. For emergencies after hours and on weekends, this number will convert over to our answering service and the on call provider will answer. Please try to keep non emergent phone calls/ questions to office hours 9am-5pm Monday through Friday.     [employer]  As an alternative, you can sign up and use the Epic MyChart system for more direct and quicker access for non emergent questions/ problems.  [facility] X[employer]me allows you to send messages to your doctor, view your test results,  renew your prescriptions, schedule appointments, and more. To sign up, go to [email or web address] and click on the Sign Up Now link in the New User? box. Enter your [employer] Activation Code exactly as it appears below along with the last four digits of your Social Security Number and your Date of Birth () to complete the sign-up process. If you do not sign up before the expiration date, you must request a new code.    [employer] Activation Code: [email or web address]  Expires: 2021  2:43 PM    If you have questions, you can email [email or web address] or call [phone] to talk to our [employer] staff. Remember, [employer] is NOT to be used for urgent needs. For medical emergencies, dial 911.

## 2021-04-30 RX ORDER — VALACYCLOVIR HYDROCHLORIDE 1 G/1
1000 TABLET, FILM COATED ORAL DAILY
Qty: 5 TABLET | Refills: 0 | Status: SHIPPED | OUTPATIENT
Start: 2021-04-30 | End: 2021-05-05

## 2022-04-23 ENCOUNTER — HOSPITAL ENCOUNTER (EMERGENCY)
Age: 22
Discharge: HOME | End: 2022-04-24
Attending: EMERGENCY MEDICINE
Payer: COMMERCIAL

## 2022-04-23 VITALS
DIASTOLIC BLOOD PRESSURE: 96 MMHG | HEART RATE: 79 BPM | BODY MASS INDEX: 27.32 KG/M2 | WEIGHT: 170 LBS | HEIGHT: 66 IN | TEMPERATURE: 98.4 F | OXYGEN SATURATION: 97 % | RESPIRATION RATE: 18 BRPM | SYSTOLIC BLOOD PRESSURE: 143 MMHG
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
## 2022-04-23 DIAGNOSIS — S40.022A ARM CONTUSION, LEFT, INITIAL ENCOUNTER: ICD-10-CM

## 2022-04-23 PROCEDURE — 99283 EMERGENCY DEPT VISIT LOW MDM: CPT

## 2022-04-23 ASSESSMENT — PAIN DESCRIPTION - DESCRIPTORS: DESCRIPTORS: THROBBING

## 2022-04-23 ASSESSMENT — PAIN SCALES - GENERAL: PAINLEVEL_OUTOF10: 7

## 2022-04-23 ASSESSMENT — PAIN - FUNCTIONAL ASSESSMENT: PAIN_FUNCTIONAL_ASSESSMENT: 0-10

## 2022-04-23 ASSESSMENT — PAIN DESCRIPTION - ORIENTATION: ORIENTATION: LEFT

## 2022-04-23 ASSESSMENT — PAIN DESCRIPTION - LOCATION: LOCATION: ARM

## 2022-04-24 ENCOUNTER — APPOINTMENT (OUTPATIENT)
Dept: GENERAL RADIOLOGY | Age: 22
End: 2022-04-24
Payer: COMMERCIAL

## 2022-04-24 PROCEDURE — 73060 X-RAY EXAM OF HUMERUS: CPT

## 2022-04-24 ASSESSMENT — ENCOUNTER SYMPTOMS
EYE PAIN: 0
VOICE CHANGE: 0
COUGH: 0
RHINORRHEA: 0
SHORTNESS OF BREATH: 0
VOMITING: 0
EYE REDNESS: 0
ABDOMINAL PAIN: 0
DIARRHEA: 0

## 2022-04-24 NOTE — ED PROVIDER NOTES
[facility] EMERGENCY DEPT  EMERGENCY DEPARTMENT ENCOUNTER      Pt Name: Shaq Zabala  MRN: [id number]  [place] 2000  Date of evaluation: 4/23/2022  Provider: Lorenzo Gregg MD    CHIEF COMPLAINT       Chief Complaint   Patient presents with    Fall     pt states she fell down 12 steps.  Arm Pain     left arm pain         HISTORY OF PRESENT ILLNESS   (Location/Symptom, Timing/Onset,Context/Setting, Quality, Duration, Modifying Factors, Severity)  Note limiting factors. Shaq Zabala is a 25 y.o. female who presents to the emergency department for evaluation after a fall after she tripped walking down stairs. Complaining of pain to the posterior left arm just above the elbow. Able to move the elbow without difficulty. Denies any associated head injury. No chest pain, shortness breath, abdominal pain. Has been ambulatory since the fall without issues. No back pain or lower extremity pain. HPI    NursingNotes were reviewed. REVIEW OF SYSTEMS    (2-9 systems for level 4, 10 or more for level 5)     Review of Systems   Constitutional: Negative for fatigue and fever. HENT: Negative for congestion, rhinorrhea and voice change. Eyes: Negative for pain and redness. Respiratory: Negative for cough and shortness of breath. Cardiovascular: Negative for chest pain. Gastrointestinal: Negative for abdominal pain, diarrhea and vomiting. Endocrine: Negative. Genitourinary: Negative. Musculoskeletal: Negative for gait problem. Skin: Negative for rash and wound. Neurological: Negative for weakness and headaches. Hematological: Negative. Psychiatric/Behavioral: Negative. All other systems reviewed and are negative. A complete review of systems was performed and is negative except as noted above in the HPI.        PAST MEDICAL HISTORY     Past Medical History:   Diagnosis Date    Allergy     Environmental allergies          SURGICAL HISTORY       Past Surgical History:   Procedure Laterality Date    ADENOIDECTOMY  07-        TONSILLECTOMY  07-    Dr Alivia Savage       Previous Medications    No medications on file       ALLERGIES     Patient has no known allergies. FAMILY HISTORY       Family History   Problem Relation Age of Onset    Hypertension Mother           SOCIAL HISTORY       Social History     Socioeconomic History    Marital status: Single     Spouse name: None    Number of children: None    Years of education: None    Highest education level: None   Occupational History    None   Tobacco Use    Smoking status: Never Smoker    Smokeless tobacco: Never Used   Vaping Use    Vaping Use: Never used   Substance and Sexual Activity    Alcohol use: No    Drug use: Not Currently     Types: Marijuana Tamika Dasen)    Sexual activity: Yes     Partners: Male     Birth control/protection: Injection   Other Topics Concern    None   Social History Narrative    None     Social Determinants of Health     Financial Resource Strain:     Difficulty of Paying Living Expenses: Not on file   Food Insecurity:     Worried About Running Out of Food in the Last Year: Not on file    Martita of Food in the Last Year: Not on file   Transportation Needs:     Lack of Transportation (Medical): Not on file    Lack of Transportation (Non-Medical):  Not on file   Physical Activity:     Days of Exercise per Week: Not on file    Minutes of Exercise per Session: Not on file   Stress:     Feeling of Stress : Not on file   Social Connections:     Frequency of Communication with Friends and Family: Not on file    Frequency of Social Gatherings with Friends and Family: Not on file    Attends [employer] Services: Not on file    Active Member of Clubs or Organizations: Not on file    Attends Club or Organization Meetings: Not on file    Marital Status: Not on file   Intimate Partner Violence:     Fear of Current or Ex-Partner: Not on file    Emotionally Abused: Not on file    Physically Abused: Not on file    Sexually Abused: Not on file   Housing Stability:     Unable to Pay for Housing in the Last Year: Not on file    Number of [place] in the Last Year: Not on file    Unstable Housing in the Last Year: Not on file       SCREENINGS    Jahaira Coma Scale  Eye Opening: Spontaneous  Best Verbal Response: Oriented  Best Motor Response: Obeys commands  [place] Coma Scale Score: 15        PHYSICAL EXAM    (up to 7 for level 4, 8 or more for level 5)     ED Triage Vitals [04/23/22 2135]   BP Temp Temp Source Pulse Resp SpO2 Height Weight   (!) 143/96 98.4 °F (36.9 °C) Oral 79 18 97 % 5' 6\" (1.676 m) 170 lb (77.1 kg)       Physical Exam  Vitals and nursing note reviewed. Constitutional:       General: She is not in acute distress. Appearance: She is well-developed. She is not toxic-appearing or diaphoretic. HENT:      Head: Normocephalic and atraumatic. Eyes:      General: No scleral icterus. Right eye: No discharge. Left eye: No discharge. Pupils: Pupils are equal, round, and reactive to light. Cardiovascular:      Rate and Rhythm: Normal rate and regular rhythm. Pulmonary:      Effort: Pulmonary effort is normal. No respiratory distress. Breath sounds: No stridor. Abdominal:      General: There is no distension. Musculoskeletal:         General: No deformity. Normal range of motion. Arms:       Cervical back: Normal range of motion. Skin:     General: Skin is warm and dry. Neurological:      Mental Status: She is alert and oriented to person, place, and time. GCS: GCS eye subscore is 4. GCS verbal subscore is 5. GCS motor subscore is 6. Cranial Nerves: No cranial nerve deficit. Motor: No abnormal muscle tone. Psychiatric:         Behavior: Behavior normal.         Thought Content:  Thought content normal.         Judgment: Judgment normal. DIAGNOSTIC RESULTS     EKG: All EKG's are interpreted by the Emergency Department Physician who either signs or Co-signs this chart in the absence of a cardiologist.        RADIOLOGY:   Non-plain film images such as CT, Ultrasound and MRI are read by the radiologist. Plainradiographic images are visualized and preliminarily interpreted by the emergency physician with the below findings:        Interpretation per the Radiologist below, if available at the time of this note:    XR HUMERUS LEFT (MIN 2 VIEWS)    (Results Pending)         ED BEDSIDE ULTRASOUND:   Performed by ED Physician - none    LABS:  Labs Reviewed - No data to display    All other labs were within normal range or not returned as of this dictation. EMERGENCY DEPARTMENT COURSE and DIFFERENTIALDIAGNOSIS/MDM:   Vitals:    Vitals:    04/23/22 2135   BP: (!) 143/96   Pulse: 79   Resp: 18   Temp: 98.4 °F (36.9 °C)   TempSrc: Oral   SpO2: 97%   Weight: 170 lb (77.1 kg)   Height: 5' 6\" (1.676 m)       MDM   X-rays with no apparent fractures or dislocations. No secondary signs of fracture such as fat-pad on x-ray. Full range of motion without difficulty. Evaluation and work-up here revealed no signs of emergent or life-threatening pathology that would necessitate admission for further work-up or management at this time. Patient is felt to be stable for discharge home with return precautions for worsening of the condition or development of new concerning symptoms. Patient was encouraged to follow-up with their primary care doctor in the appropriate timeframe. Necessary prescriptions and information have been provided for treatment at home. Patient voices understanding and agreement with the plan. CONSULTS:  None    PROCEDURES:  Unless otherwise notedbelow, none     Procedures    FINAL IMPRESSION     1. Fall on stairs or ladders in water transport injuring occupant of small unpowered boat, initial encounter    2.  Arm contusion, left, initial encounter          DISPOSITION/PLAN   DISPOSITION Decision To Discharge 04/24/2022 12:39:15 AM      PATIENT REFERRED TO:  [facility]  [facility]  [phone]    If symptoms worsen    Sherran Fothergill, MD  [address] [place] [address]  [phone]      As needed      DISCHARGE MEDICATIONS:  New Prescriptions    No medications on file          (Please note that portions of this note were completed with a voice recognition program.  Efforts were made to edit the dictations butoccasionally words are mis-transcribed.)    Merita Ahumada, MD (electronically signed)  Emergency Physician          Merita Ahumada., MD  04/24/22 5501

## 2022-05-23 ENCOUNTER — OFFICE VISIT (OUTPATIENT)
Dept: PRIMARY CARE CLINIC | Age: 22
End: 2022-05-23
Payer: COMMERCIAL

## 2022-05-23 VITALS
WEIGHT: 192 LBS | SYSTOLIC BLOOD PRESSURE: 100 MMHG | HEART RATE: 62 BPM | TEMPERATURE: 98.7 F | HEIGHT: 66 IN | BODY MASS INDEX: 30.86 KG/M2 | DIASTOLIC BLOOD PRESSURE: 66 MMHG | OXYGEN SATURATION: 98 %

## 2022-05-23 DIAGNOSIS — Z11.59 NEED FOR HEPATITIS C SCREENING TEST: ICD-10-CM

## 2022-05-23 DIAGNOSIS — Z01.419 WELL WOMAN EXAM WITH ROUTINE GYNECOLOGICAL EXAM: Primary | ICD-10-CM

## 2022-05-23 DIAGNOSIS — Z11.4 ENCOUNTER FOR SCREENING FOR HIV: ICD-10-CM

## 2022-05-23 DIAGNOSIS — Z00.00 ENCOUNTER FOR WELL ADULT EXAM WITHOUT ABNORMAL FINDINGS: ICD-10-CM

## 2022-05-23 DIAGNOSIS — Z11.59 NEED FOR HEPATITIS B SCREENING TEST: ICD-10-CM

## 2022-05-23 DIAGNOSIS — Z12.4 SCREENING FOR CERVICAL CANCER: ICD-10-CM

## 2022-05-23 LAB
ALBUMIN SERPL-MCNC: 4.8 G/DL (ref 3.5–5.2)
ALP BLD-CCNC: 70 U/L (ref 35–104)
ALT SERPL-CCNC: 8 U/L (ref 5–33)
ANION GAP SERPL CALCULATED.3IONS-SCNC: 11 MMOL/L (ref 7–19)
AST SERPL-CCNC: 15 U/L (ref 5–32)
BASOPHILS ABSOLUTE: 0 K/UL (ref 0–0.2)
BASOPHILS RELATIVE PERCENT: 0.6 % (ref 0–1)
BILIRUB SERPL-MCNC: 0.4 MG/DL (ref 0.2–1.2)
BUN BLDV-MCNC: 10 MG/DL (ref 6–20)
CALCIUM SERPL-MCNC: 10.3 MG/DL (ref 8.6–10)
CHLORIDE BLD-SCNC: 103 MMOL/L (ref 98–111)
CO2: 25 MMOL/L (ref 22–29)
CREAT SERPL-MCNC: 0.7 MG/DL (ref 0.5–0.9)
EOSINOPHILS ABSOLUTE: 0 K/UL (ref 0–0.6)
EOSINOPHILS RELATIVE PERCENT: 0.8 % (ref 0–5)
GFR AFRICAN AMERICAN: >59
GFR NON-AFRICAN AMERICAN: >60
GLUCOSE BLD-MCNC: 72 MG/DL (ref 74–109)
HCT VFR BLD CALC: 42.5 % (ref 37–47)
HEMOGLOBIN: 13 G/DL (ref 12–16)
HEPATITIS B SURFACE ANTIGEN INTERPRETATION: NORMAL
HEPATITIS C ANTIBODY INTERPRETATION: NORMAL
IMMATURE GRANULOCYTES #: 0 K/UL
LYMPHOCYTES ABSOLUTE: 1.4 K/UL (ref 1.1–4.5)
LYMPHOCYTES RELATIVE PERCENT: 27.9 % (ref 20–40)
MCH RBC QN AUTO: 27 PG (ref 27–31)
MCHC RBC AUTO-ENTMCNC: 30.6 G/DL (ref 33–37)
MCV RBC AUTO: 88.4 FL (ref 81–99)
MONOCYTES ABSOLUTE: 0.4 K/UL (ref 0–0.9)
MONOCYTES RELATIVE PERCENT: 8.4 % (ref 0–10)
NEUTROPHILS ABSOLUTE: 3.1 K/UL (ref 1.5–7.5)
NEUTROPHILS RELATIVE PERCENT: 62.1 % (ref 50–65)
PDW BLD-RTO: 11.6 % (ref 11.5–14.5)
PLATELET # BLD: 322 K/UL (ref 130–400)
PMV BLD AUTO: 9.8 FL (ref 9.4–12.3)
POTASSIUM SERPL-SCNC: 4 MMOL/L (ref 3.5–5)
RBC # BLD: 4.81 M/UL (ref 4.2–5.4)
SODIUM BLD-SCNC: 139 MMOL/L (ref 136–145)
TOTAL PROTEIN: 7.6 G/DL (ref 6.6–8.7)
TSH REFLEX FT4: 0.42 UIU/ML (ref 0.35–5.5)
WBC # BLD: 5 K/UL (ref 4.8–10.8)

## 2022-05-23 PROCEDURE — 99395 PREV VISIT EST AGE 18-39: CPT | Performed by: FAMILY MEDICINE

## 2022-05-23 ASSESSMENT — PATIENT HEALTH QUESTIONNAIRE - PHQ9
SUM OF ALL RESPONSES TO PHQ QUESTIONS 1-9: 7
SUM OF ALL RESPONSES TO PHQ QUESTIONS 1-9: 7
10. IF YOU CHECKED OFF ANY PROBLEMS, HOW DIFFICULT HAVE THESE PROBLEMS MADE IT FOR YOU TO DO YOUR WORK, TAKE CARE OF THINGS AT HOME, OR GET ALONG WITH OTHER PEOPLE: 0
8. MOVING OR SPEAKING SO SLOWLY THAT OTHER PEOPLE COULD HAVE NOTICED. OR THE OPPOSITE, BEING SO FIGETY OR RESTLESS THAT YOU HAVE BEEN MOVING AROUND A LOT MORE THAN USUAL: 3
5. POOR APPETITE OR OVEREATING: 3
SUM OF ALL RESPONSES TO PHQ9 QUESTIONS 1 & 2: 0
9. THOUGHTS THAT YOU WOULD BE BETTER OFF DEAD, OR OF HURTING YOURSELF: 0
7. TROUBLE CONCENTRATING ON THINGS, SUCH AS READING THE NEWSPAPER OR WATCHING TELEVISION: 0
1. LITTLE INTEREST OR PLEASURE IN DOING THINGS: 0
4. FEELING TIRED OR HAVING LITTLE ENERGY: 0
2. FEELING DOWN, DEPRESSED OR HOPELESS: 0
6. FEELING BAD ABOUT YOURSELF - OR THAT YOU ARE A FAILURE OR HAVE LET YOURSELF OR YOUR FAMILY DOWN: 1
3. TROUBLE FALLING OR STAYING ASLEEP: 0
SUM OF ALL RESPONSES TO PHQ QUESTIONS 1-9: 7
SUM OF ALL RESPONSES TO PHQ QUESTIONS 1-9: 7

## 2022-05-24 ASSESSMENT — ENCOUNTER SYMPTOMS
WHEEZING: 0
NAUSEA: 0
COUGH: 0
DIARRHEA: 0
VOMITING: 0
EYE DISCHARGE: 0
ABDOMINAL PAIN: 0
COLOR CHANGE: 0
BACK PAIN: 0

## 2022-05-24 NOTE — PATIENT INSTRUCTIONS

## 2022-05-24 NOTE — PROGRESS NOTES
Well Adult Note  Name: Kaye Shelton Date: 2022   MRN: [id number] Sex: Female   Age: 25 y.o. Ethnicity: Non- / Non    : 2000 Race: Kaur Kim / Alon Wagner is here for well adult exam.  History:  Patient is seen today for yearly physical and Pap. She states that she has recently been incarcerated and would like to have testing done for STDs as well as for blood work for hepatitis and HIV. She states that she is doing well. She states that she is not currently on any medications. She states that she is not noticing any vaginal discharge. She is having regular menstrual cycles. Review of Systems   Constitutional: Negative for activity change, appetite change and fever. HENT: Negative for congestion and nosebleeds. Eyes: Negative for discharge. Respiratory: Negative for cough and wheezing. Cardiovascular: Negative for chest pain and leg swelling. Gastrointestinal: Negative for abdominal pain, diarrhea, nausea and vomiting. Genitourinary: Negative for difficulty urinating, frequency and urgency. Musculoskeletal: Negative for back pain and gait problem. Skin: Negative for color change and rash. Neurological: Negative for dizziness and headaches. Hematological: Does not bruise/bleed easily. Psychiatric/Behavioral: Negative for sleep disturbance and suicidal ideas.        No Known Allergies      Prior to Visit Medications    Not on File         Past Medical History:   Diagnosis Date    Allergy     Environmental allergies        Past Surgical History:   Procedure Laterality Date    ADENOIDECTOMY  2011        TONSILLECTOMY  2011    Dr Alivia Cruz TYMPANOPLASTY  2020    [facility]- Tympanic membrane patch    TYMPANOSTOMY TUBE PLACEMENT           Family History   Problem Relation Age of Onset    Hypertension Mother        Social History     Tobacco Use    Smoking status: Never Smoker    Smokeless tobacco: Never Used Vaping Use    Vaping Use: Never used   Substance Use Topics    Alcohol use: Not Currently     Comment: Occ    Drug use: Not Currently     Types: Marijuana (Weed)       Objective   /66   Pulse 62   Temp 98.7 °F (37.1 °C) (Temporal)   Ht 5' 6\" (1.676 m)   Wt 192 lb (87.1 kg)   LMP 05/09/2022 (Exact Date)   SpO2 98%   BMI 30.99 kg/m²   Wt Readings from Last 3 Encounters:   05/23/22 192 lb (87.1 kg)   04/23/22 170 lb (77.1 kg)   11/19/20 171 lb (77.6 kg)       Physical Exam  Constitutional:       General: She is not in acute distress. Appearance: Normal appearance. She is well-developed and normal weight. She is not diaphoretic. HENT:      Head: Normocephalic and atraumatic. Neck:      Thyroid: No thyroid mass or thyromegaly. Cardiovascular:      Rate and Rhythm: Normal rate and regular rhythm. Pulses: Normal pulses. Heart sounds: Normal heart sounds. No murmur heard. Pulmonary:      Effort: Pulmonary effort is normal. No respiratory distress. Breath sounds: Normal breath sounds. Chest:   Breasts: Breasts are symmetrical.      Right: No inverted nipple, mass, nipple discharge, skin change or tenderness. Left: No inverted nipple, mass, nipple discharge, skin change or tenderness. Abdominal:      General: Abdomen is flat. Bowel sounds are normal.      Palpations: Abdomen is soft. Tenderness: There is no abdominal tenderness. Genitourinary:     Labia:         Right: No lesion. Left: No lesion. Vagina: Normal.      Cervix: No cervical motion tenderness, discharge or friability. Adnexa:         Right: No mass or fullness. Left: No mass or fullness. Musculoskeletal:      Cervical back: Normal range of motion and neck supple. Lymphadenopathy:      Cervical: No cervical adenopathy. Skin:     General: Skin is warm and dry. Neurological:      General: No focal deficit present.       Mental Status: She is alert and oriented to person, place, and time. Mental status is at baseline. Psychiatric:         Mood and Affect: Mood normal.         Behavior: Behavior normal.         Thought Content: Thought content normal.         Judgment: Judgment normal.           Assessment   Plan   1. Well woman exam with routine gynecological exam  -     HIV-1,2 Combo Ag/Ab By DAO, Reflexive Panel  -     Hepatitis C Antibody  -     Hepatitis B Surface Antigen  -     CBC with Auto Differential  -     Comprehensive Metabolic Panel  -     TSH with Reflex to FT4  2. Screening for cervical cancer  -     PAP SMEAR  -     HIV-1,2 Combo Ag/Ab By DAO, Reflexive Panel  -     Hepatitis C Antibody  -     Hepatitis B Surface Antigen  -     CBC with Auto Differential  -     Comprehensive Metabolic Panel  -     TSH with Reflex to FT4  3. Need for hepatitis C screening test  -     HIV-1,2 Combo Ag/Ab By DAO, Reflexive Panel  -     Hepatitis C Antibody  -     Hepatitis B Surface Antigen  -     CBC with Auto Differential  -     Comprehensive Metabolic Panel  -     TSH with Reflex to FT4  4. Need for hepatitis B screening test  -     HIV-1,2 Combo Ag/Ab By DAO, Reflexive Panel  -     Hepatitis C Antibody  -     Hepatitis B Surface Antigen  -     CBC with Auto Differential  -     Comprehensive Metabolic Panel  -     TSH with Reflex to FT4  5. Encounter for screening for HIV  -     HIV-1,2 Combo Ag/Ab By DAO, Reflexive Panel  -     Hepatitis C Antibody  -     Hepatitis B Surface Antigen  -     CBC with Auto Differential  -     Comprehensive Metabolic Panel  -     TSH with Reflex to FT4  6.  Encounter for well adult exam without abnormal findings         Personalized Preventive Plan   Current Health Maintenance Status  Immunization History   Administered Date(s) Administered    DTaP 2000, 2000, 2000, 05/17/2001, 03/26/2004    HPV Quadrivalent (Gardasil) 07/28/2011, 09/01/2011, 02/01/2012    Hepatitis B 2000, 2000, 02/15/2001    Hib, unspecified 2000, 2000, 2000, 05/17/2001    Influenza, Quadv, IM, PF (6 mo and older Fluzone, Flulaval, Fluarix, and 3 yrs and older Afluria) 11/14/2017    MMR 05/17/2001, 03/26/2004    Meningococcal B, Recombinant Milon Roam) 07/31/2017, 03/06/2018    Meningococcal MCV4P (Menactra) 03/26/2004, 07/28/2011, 07/31/2017    Polio IPV (IPOL) 2000, 2000, 2000, 02/15/2001    Tdap (Boostrix, Adacel) 07/28/2011, 07/31/2017    Varicella (Varivax) 02/15/2001, 07/24/2008        Health Maintenance   Topic Date Due    COVID-19 Vaccine (1) Never done    Chlamydia screen  08/29/2020    Pap smear  Never done    Flu vaccine (Season Ended) 09/01/2022    Depression Monitoring  05/23/2023    DTaP/Tdap/Td vaccine (5 - Td or Tdap) 07/31/2027    Hepatitis B vaccine  Completed    Hib vaccine  Completed    HPV vaccine  Completed    Varicella vaccine  Completed    Meningococcal (ACWY) vaccine  Completed    Hepatitis C screen  Completed    HIV screen  Completed    Hepatitis A vaccine  Aged Out    Pneumococcal 0-64 years Vaccine  Aged Out     Recommendations for [employer] Due: see orders and patient instructions/AVS.    Return in about 1 year (around 5/23/2023) for physical.

## 2022-05-25 LAB — HIV 1,2 COMBO ANTIGEN/ANTIBODY: NEGATIVE

## 2022-05-31 ENCOUNTER — TELEPHONE (OUTPATIENT)
Dept: PRIMARY CARE CLINIC | Age: 22
End: 2022-05-31

## 2022-05-31 ENCOUNTER — NURSE ONLY (OUTPATIENT)
Dept: PRIMARY CARE CLINIC | Age: 22
End: 2022-05-31
Payer: COMMERCIAL

## 2022-05-31 DIAGNOSIS — R10.2 VAGINAL PAIN: Primary | ICD-10-CM

## 2022-05-31 PROCEDURE — 96372 THER/PROPH/DIAG INJ SC/IM: CPT | Performed by: FAMILY MEDICINE

## 2022-05-31 RX ORDER — CEFTRIAXONE 1 G/1
1000 INJECTION, POWDER, FOR SOLUTION INTRAMUSCULAR; INTRAVENOUS ONCE
Status: COMPLETED | OUTPATIENT
Start: 2022-05-31 | End: 2022-05-31

## 2022-05-31 RX ADMIN — CEFTRIAXONE 500 MG: 1 INJECTION, POWDER, FOR SOLUTION INTRAMUSCULAR; INTRAVENOUS at 16:09

## 2022-05-31 NOTE — TELEPHONE ENCOUNTER
You can call her. I do not know that I have the results back on her Pap yet. I did not do anything abnormal other than doing her swab. If she is in that much discomfort we may need to look at getting an ultrasound.

## 2022-05-31 NOTE — TELEPHONE ENCOUNTER
----- Message from Isaac Rahman sent at 5/31/2022  8:39 AM CDT -----  Subject: Message to Provider    QUESTIONS  Information for Provider? Zofia Mix reached out to speak to the nurse due to   after having a pap smear on 5/23, she experienced severe pain for couple   of days and started her period 1 1/2 week early. Zofia Mix is concerned and   would like to speak to the nurse in greater detail. Please contact Victor Manuelradhapaty   on her cell # [phone]  ---------------------------------------------------------------------------  --------------  Mariluz FERRARA  What is the best way for the office to contact you? OK to leave message on   voicemail  Preferred Call Back Phone Number? [phone]  ---------------------------------------------------------------------------  --------------  SCRIPT ANSWERS  Relationship to Patient?  Self

## 2022-06-03 RX ORDER — VALACYCLOVIR HYDROCHLORIDE 1 G/1
1000 TABLET, FILM COATED ORAL DAILY
COMMUNITY
End: 2022-06-03 | Stop reason: SDUPTHER

## 2022-06-03 RX ORDER — VALACYCLOVIR HYDROCHLORIDE 1 G/1
1000 TABLET, FILM COATED ORAL DAILY
Qty: 5 TABLET | Refills: 1 | Status: SHIPPED | OUTPATIENT
Start: 2022-06-03 | End: 2022-06-08

## 2022-06-06 ENCOUNTER — OFFICE VISIT (OUTPATIENT)
Dept: PRIMARY CARE CLINIC | Age: 22
End: 2022-06-06
Payer: COMMERCIAL

## 2022-06-06 VITALS
HEART RATE: 64 BPM | WEIGHT: 191 LBS | DIASTOLIC BLOOD PRESSURE: 70 MMHG | HEIGHT: 66 IN | TEMPERATURE: 96.9 F | OXYGEN SATURATION: 100 % | BODY MASS INDEX: 30.7 KG/M2 | SYSTOLIC BLOOD PRESSURE: 130 MMHG

## 2022-06-06 DIAGNOSIS — N89.8 VAGINAL DISCHARGE: Primary | ICD-10-CM

## 2022-06-06 PROCEDURE — 99213 OFFICE O/P EST LOW 20 MIN: CPT | Performed by: FAMILY MEDICINE

## 2022-06-06 RX ORDER — FLUCONAZOLE 150 MG/1
150 TABLET ORAL
Qty: 2 TABLET | Refills: 0 | Status: SHIPPED | OUTPATIENT
Start: 2022-06-06 | End: 2022-06-12

## 2022-06-06 ASSESSMENT — ENCOUNTER SYMPTOMS
BACK PAIN: 0
VOMITING: 0
DIARRHEA: 0
ABDOMINAL PAIN: 0
NAUSEA: 0
EYE DISCHARGE: 0
COLOR CHANGE: 0
WHEEZING: 0
COUGH: 0

## 2022-06-06 NOTE — PROGRESS NOTES
SUBJECTIVE:    Patient ID: Eleanor Rodriguez is a 25 y.o. female. HPI:   Patient is seen today for complaints of vaginal irritation and discharge. She states that she has itching and burning. She states that it just does not feel normal.  She was seen last week for a Pap and tested positive for gonorrhea. She did come in and had a Rocephin shot. She states that she is still having some discomfort. She just does not feel like she is completely back to normal.  She has been taking her Valtrex which she had at home for a previous herpes outbreak but she states that she has not noticed any blisters or any sores on her vaginal area. Past Medical History:   Diagnosis Date    Allergy     Environmental allergies       Current Outpatient Medications on File Prior to Visit   Medication Sig Dispense Refill    valACYclovir (VALTREX) 1 g tablet Take 1 tablet by mouth daily for 5 days 5 tablet 1     No current facility-administered medications on file prior to visit. No Known Allergies    Review of Systems   Constitutional: Negative for activity change, appetite change and fever. HENT: Negative for congestion and nosebleeds. Eyes: Negative for discharge. Respiratory: Negative for cough and wheezing. Cardiovascular: Negative for chest pain and leg swelling. Gastrointestinal: Negative for abdominal pain, diarrhea, nausea and vomiting. Genitourinary: Positive for vaginal discharge and vaginal pain. Negative for difficulty urinating, frequency and urgency. Musculoskeletal: Negative for back pain and gait problem. Skin: Negative for color change and rash. Neurological: Negative for dizziness and headaches. Hematological: Does not bruise/bleed easily. Psychiatric/Behavioral: Negative for sleep disturbance and suicidal ideas. OBJECTIVE:    Physical Exam  Vitals reviewed. Exam conducted with a chaperone present (Marcy García). Constitutional:       General: She is not in acute distress. Appearance: Normal appearance. She is well-developed. She is not diaphoretic. HENT:      Head: Normocephalic and atraumatic. Right Ear: External ear normal.      Left Ear: External ear normal.   Cardiovascular:      Rate and Rhythm: Normal rate and regular rhythm. Pulses: Normal pulses. Heart sounds: Normal heart sounds. No murmur heard. Pulmonary:      Effort: Pulmonary effort is normal. No respiratory distress. Breath sounds: Normal breath sounds. Genitourinary:     Vagina: Vaginal discharge present. Cervix: Discharge present. Comments: White discharge with slight yellow tint. Swab was obtained for culture. Musculoskeletal:      Cervical back: Normal range of motion and neck supple. Skin:     General: Skin is warm and dry. Neurological:      General: No focal deficit present. Mental Status: She is alert and oriented to person, place, and time. Mental status is at baseline. Psychiatric:         Mood and Affect: Mood normal.         Behavior: Behavior normal.         Thought Content: Thought content normal.         Judgment: Judgment normal.        /70   Pulse 64   Temp 96.9 °F (36.1 °C)   Ht 5' 6\" (1.676 m)   Wt 191 lb (86.6 kg)   LMP 05/09/2022 (Exact Date)   SpO2 100%   BMI 30.83 kg/m²      ASSESSMENT/PLAN:    1. Vaginal discharge  -     Culture, Genital       Diflucan was sent to the pharmacy. We have obtained a genital culture today. We will notify her of the results of this. I have encouraged her to do yogurt and probiotics. If her symptoms or not getting better she is to let us know. We will notify her of the culture report and determine if we need to add anything on at that point.     PDMP Monitoring:    Last PDMP Piper schmidt Reviewed [facility]):  Review User Review Instant Review Result            Urine Drug Screenings (1 yr)     Urine Drug Screen  Collected: 1/25/2018 11:57 PM (Final result)    Complete Results              Medication Contract and Consent for Opioid Use Documents Filed      No documents found                 EMR Dragon/transcription disclaimer:  Much of this encounter note is electronic transcription/translation of spoken language toprinted texts. The electronic translation of spoken language may be erroneous, or at times, nonsensical words or phrases may be inadvertently transcribed.   Although I have reviewed the note for such errors, some may stillexist.

## 2022-06-08 ENCOUNTER — TELEPHONE (OUTPATIENT)
Dept: PRIMARY CARE CLINIC | Age: 22
End: 2022-06-08

## 2022-06-08 LAB
ATOPOBIUM VAGINAE: ABNORMAL SCORE
C TRACH DNA CVX QL NAA+PROBE: NEGATIVE
CANDIDA ALBICANS, NAA: NEGATIVE
CANDIDA GLABRATA: NEGATIVE
MEGASHAERA: ABNORMAL SCORE
NEISSERIA GONORRHOEAE, DNA: POSITIVE
VAGINAL PATHOGENS DNA PANEL: NEGATIVE
VAGINOSIS/VAGINITIS, DNA PROBE: ABNORMAL SCORE

## 2022-06-08 NOTE — TELEPHONE ENCOUNTER
I sent Esvin Briscoe suits her results about 30 minutes ago she probably just had not had time to call her yet.

## 2022-06-08 NOTE — TELEPHONE ENCOUNTER
Pt calling about her results and states she saw where she is still positive for Gonorrhea. Asking what now?

## 2022-06-09 ENCOUNTER — NURSE ONLY (OUTPATIENT)
Dept: PRIMARY CARE CLINIC | Age: 22
End: 2022-06-09
Payer: COMMERCIAL

## 2022-06-09 DIAGNOSIS — R10.2 VAGINAL PAIN: ICD-10-CM

## 2022-06-09 DIAGNOSIS — N89.8 VAGINAL DISCHARGE: Primary | ICD-10-CM

## 2022-06-09 PROCEDURE — 96372 THER/PROPH/DIAG INJ SC/IM: CPT | Performed by: FAMILY MEDICINE

## 2022-06-09 RX ORDER — CEFTRIAXONE 1 G/1
1000 INJECTION, POWDER, FOR SOLUTION INTRAMUSCULAR; INTRAVENOUS ONCE
Status: COMPLETED | OUTPATIENT
Start: 2022-06-09 | End: 2022-06-09

## 2022-06-09 RX ADMIN — CEFTRIAXONE 1000 MG: 1 INJECTION, POWDER, FOR SOLUTION INTRAMUSCULAR; INTRAVENOUS at 08:29

## 2022-06-16 ENCOUNTER — OFFICE VISIT (OUTPATIENT)
Dept: PRIMARY CARE CLINIC | Age: 22
End: 2022-06-16

## 2022-06-16 VITALS
WEIGHT: 190 LBS | OXYGEN SATURATION: 99 % | TEMPERATURE: 97.4 F | SYSTOLIC BLOOD PRESSURE: 120 MMHG | DIASTOLIC BLOOD PRESSURE: 80 MMHG | BODY MASS INDEX: 30.53 KG/M2 | HEART RATE: 72 BPM | HEIGHT: 66 IN

## 2022-06-16 DIAGNOSIS — Z86.19 HX OF GONORRHEA: Primary | ICD-10-CM

## 2022-06-17 LAB
C. TRACHOMATIS DNA ,URINE: NOT DETECTED
N. GONORRHOEAE DNA, URINE: NOT DETECTED
TRICHOMONAS VAGINALIS DNA, URINE: NOT DETECTED

## 2022-06-25 ENCOUNTER — HOSPITAL ENCOUNTER (EMERGENCY)
Age: 22
Discharge: HOME | End: 2022-06-25
Payer: COMMERCIAL

## 2022-06-25 VITALS
HEART RATE: 63 BPM | DIASTOLIC BLOOD PRESSURE: 88 MMHG | RESPIRATION RATE: 16 BRPM | TEMPERATURE: 98.3 F | SYSTOLIC BLOOD PRESSURE: 134 MMHG | OXYGEN SATURATION: 98 %

## 2022-06-25 DIAGNOSIS — L73.9 FOLLICULITIS: Primary | ICD-10-CM

## 2022-06-25 PROCEDURE — 6370000000 HC RX 637 (ALT 250 FOR IP): Performed by: PHYSICIAN ASSISTANT

## 2022-06-25 PROCEDURE — 99284 EMERGENCY DEPT VISIT MOD MDM: CPT

## 2022-06-25 PROCEDURE — 6360000002 HC RX W HCPCS: Performed by: PHYSICIAN ASSISTANT

## 2022-06-25 PROCEDURE — 96372 THER/PROPH/DIAG INJ SC/IM: CPT

## 2022-06-25 RX ORDER — SULFAMETHOXAZOLE AND TRIMETHOPRIM 800; 160 MG/1; MG/1
1 TABLET ORAL 2 TIMES DAILY
Qty: 20 TABLET | Refills: 0 | Status: SHIPPED | OUTPATIENT
Start: 2022-06-25 | End: 2022-07-05

## 2022-06-25 RX ORDER — SULFAMETHOXAZOLE AND TRIMETHOPRIM 800; 160 MG/1; MG/1
1 TABLET ORAL ONCE
Status: COMPLETED | OUTPATIENT
Start: 2022-06-25 | End: 2022-06-25

## 2022-06-25 RX ORDER — DEXAMETHASONE SODIUM PHOSPHATE 10 MG/ML
4 INJECTION, SOLUTION INTRAMUSCULAR; INTRAVENOUS ONCE
Status: COMPLETED | OUTPATIENT
Start: 2022-06-25 | End: 2022-06-25

## 2022-06-25 RX ADMIN — SULFAMETHOXAZOLE AND TRIMETHOPRIM 1 TABLET: 800; 160 TABLET ORAL at 19:48

## 2022-06-25 RX ADMIN — DEXAMETHASONE SODIUM PHOSPHATE 4 MG: 10 INJECTION INTRAMUSCULAR; INTRAVENOUS at 19:48

## 2022-06-25 NOTE — LETTER
[facility] EMERGENCY DEPT  [facility] [address]  Phone: [phone]               June 25, 2022    Patient: Mary Escobedo   YOB: 2000   Date of Visit: 6/25/2022       To Whom It May Concern:    Mary Escobedo was seen and treated in our emergency department on 6/25/2022. She may return to work 6/26/2022.       Sincerely,       Janet Lynn RN         Signature:__________________________________

## 2022-06-26 ASSESSMENT — ENCOUNTER SYMPTOMS
GASTROINTESTINAL NEGATIVE: 1
NAUSEA: 0
EYES NEGATIVE: 1
RESPIRATORY NEGATIVE: 1
VOMITING: 0

## 2022-06-26 NOTE — ED PROVIDER NOTES
[facility] EMERGENCY DEPT  EMERGENCY DEPARTMENT ENCOUNTER      Pt Name: Ashleigh Martin  MRN: [id number]  [place] 2000  Date of evaluation: 6/25/2022  Provider: Emily Washington PA-C    CHIEF COMPLAINT       Chief Complaint   Patient presents with   Calderon Rodriguez 83     thinks she has a spider bite on her chin and jaw         HISTORY OF PRESENT ILLNESS   (Location/Symptom, Timing/Onset, Context/Setting, Quality, Duration, Modifying Factors, Severity)  Note limiting factors. HPI      Ashleigh Martin is a 25 y.o. female who presents to the emergency department with concern for spider bite to the face. Patient states last night she pluck the hairs along her chin and then noticed later in the evening a spider crawling in her hair which she wiped off of her face. Patient states that when she woke up she had 2 areas of swelling, one on the left jaw as well as 1 on the right side of her chin which have continued to increase in size, swelling, and she is concerned that she may have been bit by a spider. Patient denies any difficulty moving her jaw, pain with movement of her jaw, fevers, chills, diaphoresis, nausea, vomiting. Patient denies any history of previously diagnosed cystic acne and presents at this time for further evaluation. Records reviewed show patient last seen in the ED on 4/23/2022 for fall downstairs, left arm contusion. Exam patient was in the outpatient setting at the PCP office on 6/16/2022 for history of gonorrhea. Nursing Notes were reviewed. REVIEW OF SYSTEMS    (2-9 systems for level 4, 10 or more for level 5)     Review of Systems   Constitutional: Negative. Negative for chills, diaphoresis and fever. HENT: Negative. Eyes: Negative. Respiratory: Negative. Cardiovascular: Negative. Gastrointestinal: Negative. Negative for nausea and vomiting. Genitourinary: Negative. Denies pregnancy   Musculoskeletal: Negative.   Negative for myalgias, neck pain and neck stiffness. Skin: Positive for wound (Abscess, right chin, left jawline). Allergic/Immunologic: Negative for immunocompromised state. Neurological: Negative. Negative for headaches. Psychiatric/Behavioral: Negative. All other systems reviewed and are negative. Except as noted above the remainder of the review of systems was reviewed and negative. PAST MEDICAL HISTORY     Past Medical History:   Diagnosis Date    Allergy     Environmental allergies          SURGICAL HISTORY       Past Surgical History:   Procedure Laterality Date    ADENOIDECTOMY  07-        TONSILLECTOMY  07-    Dr Bunch   Jarajat Flavors  11/2020    [facility]- Tympanic membrane patch    TYMPANOSTOMY TUBE PLACEMENT           CURRENT MEDICATIONS       Discharge Medication List as of 6/25/2022  7:49 PM          ALLERGIES     Patient has no known allergies. FAMILY HISTORY       Family History   Problem Relation Age of Onset    Hypertension Mother           SOCIAL HISTORY       Social History     Socioeconomic History    Marital status: Single     Spouse name: None    Number of children: None    Years of education: None    Highest education level: None   Occupational History    None   Tobacco Use    Smoking status: Never Smoker    Smokeless tobacco: Never Used   Vaping Use    Vaping Use: Never used   Substance and Sexual Activity    Alcohol use: Not Currently     Comment:  Occ    Drug use: Not Currently     Types: Marijuana Stormy Awkward)    Sexual activity: Yes     Partners: Male     Birth control/protection: Injection   Other Topics Concern    None   Social History Narrative    None     Social Determinants of Health     Financial Resource Strain:     Difficulty of Paying Living Expenses: Not on file   Food Insecurity:     Worried About Running Out of Food in the Last Year: Not on file    Martita of Food in the Last Year: Not on file   Transportation Needs:     Lack of Transportation (Medical): Not on file    Lack of Transportation (Non-Medical): Not on file   Physical Activity:     Days of Exercise per Week: Not on file    Minutes of Exercise per Session: Not on file   Stress:     Feeling of Stress : Not on file   Social Connections:     Frequency of Communication with Friends and Family: Not on file    Frequency of Social Gatherings with Friends and Family: Not on file    Attends Alevism Services: Not on file    Active Member of [address] [place] [address] [email or web address] or Organizations: Not on file    Attends Club or Organization Meetings: Not on file    Marital Status: Not on file   Intimate Partner Violence:     Fear of Current or Ex-Partner: Not on file    Emotionally Abused: Not on file    Physically Abused: Not on file    Sexually Abused: Not on file   Housing Stability:     Unable to Pay for Housing in the Last Year: Not on file    Number of [place] in the Last Year: Not on file    Unstable Housing in the Last Year: Not on file       SCREENINGS         Jahaira Coma Scale  Eye Opening: Spontaneous  Best Verbal Response: Oriented  Best Motor Response: Obeys commands  Jahaira Coma Scale Score: 15                     CIWA Assessment  BP: 134/88  Heart Rate: 63                 PHYSICAL EXAM    (up to 7 for level 4, 8 or more for level 5)     ED Triage Vitals [06/25/22 1920]   BP Temp Temp src Heart Rate Resp SpO2 Height Weight   134/88 98.3 °F (36.8 °C) -- 63 16 98 % -- --       Physical Exam  Vitals and nursing note reviewed. Constitutional:       General: She is not in acute distress. Appearance: Normal appearance. She is well-developed, well-groomed and overweight. She is not ill-appearing, toxic-appearing or diaphoretic. HENT:      Head: Normocephalic.         Right Ear: Tympanic membrane, ear canal and external ear normal.      Left Ear: Tympanic membrane, ear canal and external ear normal.      Nose: Nose normal.      Mouth/Throat:      Mouth: Mucous membranes are moist.      Pharynx: Oropharynx is clear.   Eyes:      Conjunctiva/sclera: Conjunctivae normal.      Pupils: Pupils are equal, round, and reactive to light. Cardiovascular:      Rate and Rhythm: Normal rate and regular rhythm. Pulmonary:      Effort: Pulmonary effort is normal.      Breath sounds: Normal breath sounds. Abdominal:      Palpations: Abdomen is soft. Musculoskeletal:         General: Normal range of motion. Cervical back: Normal range of motion and neck supple. Skin:     General: Skin is warm and dry. Findings: Wound present. No erythema. Comments: Cystic acne noted along jawline with area to the left jaw tender, slightly swollen, with no surrounding erythema or discharge. No fluctuance or induration noted. Area of folliculitis noted to the right side of anterior chin with tenderness, swelling, no surrounding or streaking erythema. No fluctuance or induration felt at this time. No further evidence of abscesses or infected cystic acne. Neurological:      Mental Status: She is alert and oriented to person, place, and time. Gait: Gait normal.   Psychiatric:         Attention and Perception: Attention normal.         Mood and Affect: Mood normal.         Speech: Speech normal.         Behavior: Behavior normal. Behavior is cooperative. EMERGENCY DEPARTMENT COURSE and DIFFERENTIAL DIAGNOSIS/MDM:   Vitals:    Vitals:    06/25/22 1920   BP: 134/88   Pulse: 63   Resp: 16   Temp: 98.3 °F (36.8 °C)   SpO2: 98%           MDM  Number of Diagnoses or Management Options     Amount and/or Complexity of Data Reviewed  Tests in the medicine section of CPT®: reviewed and ordered  Decide to obtain previous medical records or to obtain history from someone other than the patient: yes  Review and summarize past medical records: yes    Patient Progress  Patient progress: osmin Jensen is a 25 y.o. female who presents to the emergency department with concern for spider bite to the face.   After initial evaluation discussed with patient need for antibiotic treatment for folliculitis to the right anterior chin as well as importance of warm compresses. Discussed that the remainder of areas to the jawline are consistent with cystic acne for which she can follow at her primary care provider or dermatologist.  Discussed need for antibiotic compliance, wound reevaluation through primary care provider or dermatologist within the next 48 hours. Discussed return precautions and need for immediate return to ED should she develop any new or worsening symptoms. Patient's vital signs remained stable including no evidence of tachycardia, afebrile, patient with no difficulty swallowing, no lymphadenopathy noted upon examination. Patient appreciated no further questions or concerns, and is at this time and is stable for discharge. Procedures      FINAL IMPRESSION      1. Folliculitis          DISPOSITION/PLAN   DISPOSITION Decision To Discharge 06/25/2022 07:41:21 PM      PATIENT REFERRED TO:  Carmelo Cunningham MD  [address] [place] [address]  [phone]    Schedule an appointment as soon as possible for a visit in 2 days      [facility] EMERGENCY DEPT  [facility]  [phone]    As needed      DISCHARGE MEDICATIONS:  Discharge Medication List as of 6/25/2022  7:49 PM      START taking these medications    Details   sulfamethoxazole-trimethoprim (BACTRIM DS) 800-160 MG per tablet Take 1 tablet by mouth 2 times daily for 10 days, Disp-20 tablet, R-0Print           Controlled Substances Monitoring:     No flowsheet data found.     (Please note that portions of this note were completed with a voice recognition program.  Efforts were made to edit the dictations but occasionally words are mis-transcribed.)    Mami Mae PA-C (electronically signed)           Mami Mae PA-C  06/26/22 [id number]

## 2022-06-27 ENCOUNTER — OFFICE VISIT (OUTPATIENT)
Dept: PRIMARY CARE CLINIC | Age: 22
End: 2022-06-27
Payer: COMMERCIAL

## 2022-06-27 ENCOUNTER — HOSPITAL ENCOUNTER (EMERGENCY)
Age: 22
Discharge: HOME | End: 2022-06-27
Payer: COMMERCIAL

## 2022-06-27 VITALS
TEMPERATURE: 98.7 F | DIASTOLIC BLOOD PRESSURE: 84 MMHG | SYSTOLIC BLOOD PRESSURE: 123 MMHG | OXYGEN SATURATION: 98 % | RESPIRATION RATE: 18 BRPM | HEART RATE: 54 BPM

## 2022-06-27 VITALS
TEMPERATURE: 96.1 F | SYSTOLIC BLOOD PRESSURE: 110 MMHG | BODY MASS INDEX: 29.41 KG/M2 | OXYGEN SATURATION: 99 % | HEIGHT: 66 IN | HEART RATE: 62 BPM | WEIGHT: 183 LBS | DIASTOLIC BLOOD PRESSURE: 70 MMHG

## 2022-06-27 DIAGNOSIS — L02.91 ABSCESS: Primary | ICD-10-CM

## 2022-06-27 DIAGNOSIS — L02.91 ABSCESS: ICD-10-CM

## 2022-06-27 DIAGNOSIS — L70.0 CYSTIC ACNE: Primary | ICD-10-CM

## 2022-06-27 DIAGNOSIS — L70.0 CYSTIC ACNE: ICD-10-CM

## 2022-06-27 PROCEDURE — 99283 EMERGENCY DEPT VISIT LOW MDM: CPT

## 2022-06-27 PROCEDURE — 99213 OFFICE O/P EST LOW 20 MIN: CPT | Performed by: FAMILY MEDICINE

## 2022-06-27 PROCEDURE — 6370000000 HC RX 637 (ALT 250 FOR IP): Performed by: PHYSICIAN ASSISTANT

## 2022-06-27 RX ORDER — HYDROCODONE BITARTRATE AND ACETAMINOPHEN 7.5; 325 MG/1; MG/1
1 TABLET ORAL ONCE
Status: COMPLETED | OUTPATIENT
Start: 2022-06-27 | End: 2022-06-27

## 2022-06-27 RX ADMIN — HYDROCODONE BITARTRATE AND ACETAMINOPHEN 1 TABLET: 7.5; 325 TABLET ORAL at 17:53

## 2022-06-27 ASSESSMENT — ENCOUNTER SYMPTOMS
EYE DISCHARGE: 0
DIARRHEA: 0
WHEEZING: 0
COLOR CHANGE: 0
NAUSEA: 0
COUGH: 0
VOMITING: 0
BACK PAIN: 0
ABDOMINAL PAIN: 0

## 2022-06-27 ASSESSMENT — PAIN DESCRIPTION - LOCATION: LOCATION: FACE

## 2022-06-27 ASSESSMENT — PAIN DESCRIPTION - ORIENTATION: ORIENTATION: RIGHT

## 2022-06-27 ASSESSMENT — PATIENT HEALTH QUESTIONNAIRE - PHQ9
SUM OF ALL RESPONSES TO PHQ QUESTIONS 1-9: 0
SUM OF ALL RESPONSES TO PHQ9 QUESTIONS 1 & 2: 0
SUM OF ALL RESPONSES TO PHQ QUESTIONS 1-9: 0
1. LITTLE INTEREST OR PLEASURE IN DOING THINGS: 0
2. FEELING DOWN, DEPRESSED OR HOPELESS: 0

## 2022-06-27 ASSESSMENT — PAIN SCALES - GENERAL: PAINLEVEL_OUTOF10: 7

## 2022-06-27 NOTE — PROGRESS NOTES
SUBJECTIVE:    Patient ID: Nat Silverman is a 25 y.o. female. HPI:   Patient is seen today for complaints of a cyst on her chin on the right side. States that it came up this weekend. States it is very sore. She states that she was seen in urgent care this week and I did start her on Bactrim. She states she has gotten 3 doses and so far. They also gave her a Rocephin injection. She states that after the Rocephin injection it did go down quite a bit. She states that it is still very tender. It is not draining anything. She has never had a cyst like this previously. She states that she is not running fever but has had a few dizzy spells. Past Medical History:   Diagnosis Date    Allergy     Environmental allergies       Current Outpatient Medications on File Prior to Visit   Medication Sig Dispense Refill    sulfamethoxazole-trimethoprim (BACTRIM DS) 800-160 MG per tablet Take 1 tablet by mouth 2 times daily for 10 days 20 tablet 0     No current facility-administered medications on file prior to visit. No Known Allergies    Review of Systems   Constitutional: Negative for activity change, appetite change and fever. HENT: Negative for congestion and nosebleeds. Eyes: Negative for discharge. Respiratory: Negative for cough and wheezing. Cardiovascular: Negative for chest pain and leg swelling. Gastrointestinal: Negative for abdominal pain, diarrhea, nausea and vomiting. Genitourinary: Negative for difficulty urinating, frequency and urgency. Musculoskeletal: Negative for back pain and gait problem. Skin: Negative for color change and rash. Cyst on chin   Neurological: Negative for dizziness and headaches. Hematological: Does not bruise/bleed easily. Psychiatric/Behavioral: Negative for sleep disturbance and suicidal ideas. OBJECTIVE:    Physical Exam  Vitals reviewed. Constitutional:       General: She is not in acute distress.      Appearance: Normal appearance. She is well-developed. She is not diaphoretic. HENT:      Head: Normocephalic and atraumatic. Right Ear: External ear normal.      Left Ear: External ear normal.   Cardiovascular:      Rate and Rhythm: Normal rate and regular rhythm. Pulses: Normal pulses. Heart sounds: Normal heart sounds. No murmur heard. Pulmonary:      Effort: Pulmonary effort is normal. No respiratory distress. Breath sounds: Normal breath sounds. Musculoskeletal:      Cervical back: Normal range of motion and neck supple. Skin:     General: Skin is warm and dry. Comments: +Painful cyst on chin that is approximately 3 cm in size   Neurological:      General: No focal deficit present. Mental Status: She is alert and oriented to person, place, and time. Mental status is at baseline. Psychiatric:         Mood and Affect: Mood normal.         Behavior: Behavior normal.         Thought Content: Thought content normal.         Judgment: Judgment normal.        /70   Pulse 62   Temp (!) 96.1 °F (35.6 °C)   Ht 5' 6\" (1.676 m)   Wt 183 lb (83 kg)   LMP 05/09/2022   SpO2 99%   BMI 29.54 kg/m²      ASSESSMENT/PLAN:    1. Cystic acne  2. Abscess       Continue Bactrim. I encouraged her to continue to do warm compresses on her chin. I encouraged her to use Dial soap on this area. If her symptoms or not getting better by Thursday she is to come back and let me look at it and I may put a referral in for her to see ENT for further evaluation.     PDMP Monitoring:    Last PDMP [place] as Reviewed [facility]):  Review User Review Instant Review Result            Urine Drug Screenings (1 yr)     Urine Drug Screen  Collected: 1/25/2018 11:57 PM (Final result)    Complete Results              Medication Contract and Consent for Opioid Use Documents Filed      No documents found                 EMR Dragon/transcription disclaimer:  Much of this encounter note is electronic transcription/translation of spoken language toprinted texts. The electronic translation of spoken language may be erroneous, or at times, nonsensical words or phrases may be inadvertently transcribed.   Although I have reviewed the note for such errors, some may stillexist.

## 2022-06-27 NOTE — ED PROVIDER NOTES
[facility] EMERGENCY DEPT  EMERGENCY DEPARTMENT ENCOUNTER      Pt Name: Anjum Callahan  MRN: [id number]  [place] 2000  Date of evaluation: 6/27/2022  Provider: Raymond Perez PA-C    CHIEF COMPLAINT       Chief Complaint   Patient presents with    Insect Bite     Bit friday by spider, thinks bites are getting bigger and more painful         HISTORY OF PRESENT ILLNESS   (Location/Symptom, Timing/Onset, Context/Setting, Quality, Duration, Modifying Factors, Severity)  Note limiting factors. HPI      Anjum Callahan is a 25 y.o. female who presents to the emergency department presenting to ED with chin abscess. PMH significant for cystic ance, environmental allergies. Patient states approximately 4 days ago she plucked a hair from her chin and the next morning woke up concerned that she had been bitten by a spider in the same area as well as had a smaller wound on the left jawline. Patient states that she was seen in the ED and was given a prescription for Bactrim which she has been compliant with and she has been \"kind of using warm compresses. \"  Patient states that the area is continued to increase in pressure, pain, as well as an itchy sensation at which time she presents for further evaluation. Mother at bedside states that patient has not been compliant with the warm compresses and is adamant that this was a brown recluse bite however mother did not believe this was true as there appears to be no puncture wounds on the site. Mother states that patient was seen by her primary care provider and advised that this may require incision and drainage however she needs close outpatient monitoring with a reevaluation tomorrow. Patient denies fevers, chills, diaphoresis, nausea, vomiting, trouble swallowing, or any other skin wounds. Records reviewed show patient was seen in the ED on 6/25/22 and diagnosed with folliculitis. Patient given prescription for Bactrim.     Patient was seen in the PCP office earlier today for cystic ance and abscess. Patient advised to continue bactrim and warm compresses. Nursing Notes were reviewed. REVIEW OF SYSTEMS    (2-9 systems for level 4, 10 or more for level 5)     Review of Systems   Constitutional: Negative. Negative for chills, diaphoresis and fever. HENT: Negative. Negative for sore throat and trouble swallowing. Eyes: Negative. Respiratory: Negative. Cardiovascular: Negative. Gastrointestinal: Negative. Negative for nausea and vomiting. Genitourinary: Negative. Musculoskeletal: Negative. Negative for neck pain and neck stiffness. Skin: Positive for wound (Right chin and left jaw abscess). Allergic/Immunologic: Negative for immunocompromised state. Neurological: Negative. Negative for headaches. Psychiatric/Behavioral: Negative. All other systems reviewed and are negative. Except as noted above the remainder of the review of systems was reviewed and negative. PAST MEDICAL HISTORY     Past Medical History:   Diagnosis Date    Allergy     Environmental allergies          SURGICAL HISTORY       Past Surgical History:   Procedure Laterality Date    ADENOIDECTOMY  07-        TONSILLECTOMY  07-    Dr Alivia Bray [facility]  11/2020    [facility]- Tympanic membrane patch    TYMPANOSTOMY TUBE PLACEMENT           CURRENT MEDICATIONS       Discharge Medication List as of 6/27/2022  5:57 PM      CONTINUE these medications which have NOT CHANGED    Details   sulfamethoxazole-trimethoprim (BACTRIM DS) 800-160 MG per tablet Take 1 tablet by mouth 2 times daily for 10 days, Disp-20 tablet, R-0Print             ALLERGIES     Patient has no known allergies.     FAMILY HISTORY       Family History   Problem Relation Age of Onset    Hypertension Mother           SOCIAL HISTORY       Social History     Socioeconomic History    Marital status: Single     Spouse name: None    Number of children: None    Years of education: None    Highest education level: None   Occupational History    None   Tobacco Use    Smoking status: Never Smoker    Smokeless tobacco: Never Used   Vaping Use    Vaping Use: Never used   Substance and Sexual Activity    Alcohol use: Not Currently     Comment: Occ    Drug use: Not Currently     Types: Marijuana Estuardo Divine)    Sexual activity: Yes     Partners: Male     Birth control/protection: Injection   Other Topics Concern    None   Social History Narrative    None     Social Determinants of Health     Financial Resource Strain:     Difficulty of Paying Living Expenses: Not on file   Food Insecurity:     Worried About Running Out of Food in the Last Year: Not on file    Martita of Food in the Last Year: Not on file   Transportation Needs:     Lack of Transportation (Medical): Not on file    Lack of Transportation (Non-Medical):  Not on file   Physical Activity:     Days of Exercise per Week: Not on file    Minutes of Exercise per Session: Not on file   Stress:     Feeling of Stress : Not on file   Social Connections:     Frequency of Communication with Friends and Family: Not on file    Frequency of Social Gatherings with Friends and Family: Not on file    Attends Shinto Services: Not on file    Active Member of [address] [place] [address] [employer] or Organizations: Not on file    Attends Club or Organization Meetings: Not on file    Marital Status: Not on file   Intimate Partner Violence:     Fear of Current or Ex-Partner: Not on file    Emotionally Abused: Not on file    Physically Abused: Not on file    Sexually Abused: Not on file   Housing Stability:     Unable to Pay for Housing in the Last Year: Not on file    Number of [place] in the Last Year: Not on file    Unstable Housing in the Last Year: Not on file       SCREENINGS         Jahaira Coma Scale  Eye Opening: Spontaneous  Best Verbal Response: Oriented  Best Motor Response: Obeys commands  Jahaira Coma Scale Score: 15                     SANDRA Assessment  BP: 123/84  Heart Rate: 54                 PHYSICAL EXAM    (up to 7 for level 4, 8 or more for level 5)     ED Triage Vitals   BP Temp Temp src Pulse Resp SpO2 Height Weight   -- -- -- -- -- -- -- --       Physical Exam  Vitals and nursing note reviewed. Constitutional:       General: She is in acute distress (appears uncomfortable due to pain). Appearance: Normal appearance. She is well-developed, well-groomed and overweight. She is not diaphoretic. HENT:      Head: Normocephalic. Comments: Cystic acne noted across entire jawline with an area of swelling and tenderness to the left lateral draw. Area of folliculitis noted to the right anterior chin which was seen by this provider 2 days ago and has decreased in swelling. No surrounding or streaking erythema, no fluctuation or induration. No evidence of puncture wounds consistent with a spider bite. No further acute abnormalities to face or scalp. Mouth/Throat:      Mouth: Mucous membranes are moist.      Pharynx: Oropharynx is clear. Eyes:      Conjunctiva/sclera: Conjunctivae normal.      Pupils: Pupils are equal, round, and reactive to light. Cardiovascular:      Rate and Rhythm: Normal rate. Pulmonary:      Effort: Pulmonary effort is normal.      Breath sounds: Normal breath sounds. Abdominal:      Palpations: Abdomen is soft. Musculoskeletal:         General: Normal range of motion. Cervical back: Normal range of motion. Skin:     General: Skin is warm and dry. Findings: Abscess (As described in HEENT section) present. No erythema. Neurological:      Mental Status: She is alert and oriented to person, place, and time. Gait: Gait normal.   Psychiatric:         Attention and Perception: Attention normal.         Mood and Affect: Mood is anxious. Speech: Speech normal.         Behavior: Behavior normal. Behavior is cooperative.          DIAGNOSTIC RESULTS       Interpretation per the Radiologist below, if available at the time of this note:    No orders to display     LABS:  Labs Reviewed - No data to display    All other labs were within normal range or not returned as of this dictation. EMERGENCY DEPARTMENT COURSE and DIFFERENTIAL DIAGNOSIS/MDM:   Vitals:    Vitals:    06/27/22 1715   BP: 123/84   Pulse: 54   Resp: 18   Temp: 98.7 °F (37.1 °C)   TempSrc: Oral   SpO2: 98%           MDM  Number of Diagnoses or Management Options     Amount and/or Complexity of Data Reviewed  Tests in the medicine section of CPT®: ordered and reviewed  Decide to obtain previous medical records or to obtain history from someone other than the patient: yes  Obtain history from someone other than the patient: yes (Mother)  Review and summarize past medical records: yes    Patient Progress  Patient progress: improved        Fermin Moreau is a 25 y.o. female who presents to the emergency department presenting to ED with chin abscess. PMH significant for cystic ance, environmental allergies. After evaluation discussed with patient improvement of chin abscess from previous evaluation as well as equivocal appearance of the left cystic jawline acne. Patient given a dose of pain medication which helped alleviate her symptoms. Discussed that due to recently receiving the large dose of steroids it is not appropriate to give her any further at this time. Discussed need for continued compliance with antibiotics and significant importance of the warm compresses. Discussed with patient ability to use antihistamine such as Benadryl should she continue to have itching however this is due to the tautness of the skin. Discussed reassuring findings including no evidence of systemic infection with normal vital signs, afebrile status at home, no nausea, no vomiting.   Discussed with patient that it is important she follows up with her primary care provider not only for reevaluation to assure resolution of the abscess but also for discussion of treatment of cystic acne with primary care provider or dermatologist.  Discussed return precautions and need for immediate return to ED should she develop worsening symptoms. She has no further questions concerns, or needs at this time and is stable for discharge. Procedures      FINAL IMPRESSION      1. Abscess    2. Cystic acne          DISPOSITION/PLAN   DISPOSITION Decision To Discharge 06/27/2022 06:19:40 PM      PATIENT REFERRED TO:  Woody Mckeon MD  [address] [place] [address]  [phone]    Schedule an appointment as soon as possible for a visit in 2 days      [facility]  [place]  [phone]  Schedule an appointment as soon as possible for a visit in 2 days        DISCHARGE MEDICATIONS:  Discharge Medication List as of 6/27/2022  5:57 PM        Controlled Substances Monitoring:     No flowsheet data found.     (Please note that portions of this note were completed with a voice recognition program.  Efforts were made to edit the dictations but occasionally words are mis-transcribed.)    Abiola Harmon PA-C (electronically signed)           Abiola Harmon PA-C  06/28/22 8806

## 2022-06-28 ENCOUNTER — CARE COORDINATION (OUTPATIENT)
Dept: OTHER | Facility: CLINIC | Age: 22
End: 2022-06-28

## 2022-06-28 ENCOUNTER — OFFICE VISIT (OUTPATIENT)
Age: 22
End: 2022-06-28
Payer: COMMERCIAL

## 2022-06-28 VITALS
TEMPERATURE: 97.2 F | DIASTOLIC BLOOD PRESSURE: 62 MMHG | OXYGEN SATURATION: 99 % | HEART RATE: 74 BPM | SYSTOLIC BLOOD PRESSURE: 116 MMHG

## 2022-06-28 DIAGNOSIS — L02.01 ABSCESS OF CHIN: Primary | ICD-10-CM

## 2022-06-28 PROCEDURE — 10060 I&D ABSCESS SIMPLE/SINGLE: CPT

## 2022-06-28 PROCEDURE — 99213 OFFICE O/P EST LOW 20 MIN: CPT

## 2022-06-28 RX ORDER — CLINDAMYCIN HYDROCHLORIDE 300 MG/1
300 CAPSULE ORAL 3 TIMES DAILY
Qty: 30 CAPSULE | Refills: 0 | Status: SHIPPED | OUTPATIENT
Start: 2022-06-28 | End: 2022-07-08

## 2022-06-28 ASSESSMENT — ENCOUNTER SYMPTOMS
SORE THROAT: 0
VOMITING: 0
GASTROINTESTINAL NEGATIVE: 1
TROUBLE SWALLOWING: 0
NAUSEA: 0
RESPIRATORY NEGATIVE: 1
FACIAL SWELLING: 1
EYES NEGATIVE: 1

## 2022-06-28 NOTE — CARE COORDINATION
ACM attempted to reach patient for introduction to Associate Care Management related to ED visits x2 and one office visit for abscess on chin . HIPAA compliant message left requesting a return phone call. Will attempt to outreach patient again.      Magdalena EVANGELISTAN, RN- [facility]  Associate Care Manager  [phone]

## 2022-06-28 NOTE — PROGRESS NOTES
[address]  [address] [address] [place] [address]  Dept: [phone]  Dept Fax: [phone]  Loc: [phone]    Nat Silverman is a 25 y.o. female who presents today for her medical conditions/complaints as noted below. Nat Silverman is c/o of Facial Swelling and Insect Bite        HPI:     CASSIDY Chiang presents with complaints of spider bites to her face. She states that Friday while working she noticed a red bump on her chin. She states that later that day she noticed something crawling on her shirt and she killed 2 spiders - which she presents today, one a jovel spider and one a brown recluse. She states that her chin began swelling throughout the day. She went to ER on Saturday and was given a shot per her report and discharged with bactrim. She states she has been taking the bactrim since then but the swelling and pain have gotten worse. She went to PCP office on Monday and they stated to continue the antibiotics and follow up Thursday if not better. She states she stayed awake all night performing warm compresses but she has not had relief. Patient is requesting drainage of the wound. Past Medical History:   Diagnosis Date    Allergy     Environmental allergies      Past Surgical History:   Procedure Laterality Date    ADENOIDECTOMY  07-        TONSILLECTOMY  07-    Dr Timothy Osorio  11/2020    [facility]- Tympanic membrane patch    TYMPANOSTOMY TUBE PLACEMENT         Family History   Problem Relation Age of Onset    Hypertension Mother        Social History     Tobacco Use    Smoking status: Never Smoker    Smokeless tobacco: Never Used   Substance Use Topics    Alcohol use: Not Currently     Comment:  Occ      Current Outpatient Medications   Medication Sig Dispense Refill    clindamycin (CLEOCIN) 300 MG capsule Take 1 capsule by mouth 3 times daily for 10 days 30 capsule 0    mupirocin (BACTROBAN) 2 % ointment Apply topically 3 times daily. 3 g 0    sulfamethoxazole-trimethoprim (BACTRIM DS) 800-160 MG per tablet Take 1 tablet by mouth 2 times daily for 10 days 20 tablet 0     No current facility-administered medications for this visit. No Known Allergies    Health Maintenance   Topic Date Due    COVID-19 Vaccine (1) Never done    Flu vaccine (Season Ended) 09/01/2022    Chlamydia screen  06/16/2023    Depression Monitoring  06/27/2023    Pap smear  05/31/2025    DTaP/Tdap/Td vaccine (5 - Td or Tdap) 07/31/2027    Hepatitis B vaccine  Completed    Hib vaccine  Completed    HPV vaccine  Completed    Varicella vaccine  Completed    Meningococcal (ACWY) vaccine  Completed    Hepatitis C screen  Completed    HIV screen  Completed    Hepatitis A vaccine  Aged Out    Pneumococcal 0-64 years Vaccine  Aged Out       Subjective:     Review of Systems   Constitutional: Negative for fever. HENT: Positive for facial swelling (jaw pain). Skin: Positive for wound (abscess to lower right chin). Negative for rash. Neurological: Negative for headaches.       :Objective      Physical Exam  Constitutional:       General: She is not in acute distress. Appearance: Normal appearance. She is not toxic-appearing. HENT:      Head: Normocephalic and atraumatic. Right Ear: Tympanic membrane, ear canal and external ear normal.      Left Ear: Tympanic membrane, ear canal and external ear normal.      Nose: Nose normal.      Mouth/Throat:      Mouth: Mucous membranes are moist.   Eyes:      General:         Right eye: No discharge. Left eye: No discharge. Conjunctiva/sclera: Conjunctivae normal.   Cardiovascular:      Rate and Rhythm: Normal rate and regular rhythm. Pulmonary:      Effort: Pulmonary effort is normal. No respiratory distress. Abdominal:      General: Abdomen is flat. Palpations: Abdomen is soft. Musculoskeletal:         General: Normal range of motion. Cervical back: Normal range of motion. Lymphadenopathy:      Cervical: No cervical adenopathy. Skin:     General: Skin is warm and dry. Capillary Refill: Capillary refill takes less than 2 seconds. Findings: No rash. Neurological:      General: No focal deficit present. Mental Status: She is alert. Psychiatric:         Mood and Affect: Mood normal.       /62   Pulse 74   Temp 97.2 °F (36.2 °C) (Temporal)   SpO2 99%     :Assessment       Diagnosis Orders   1. Abscess of chin  clindamycin (CLEOCIN) 300 MG capsule    mupirocin (BACTROBAN) 2 % ointment    Culture, Aerobic and Anaerobic    10620 - DE DRAIN SKIN ABSCESS SIMPLE       :Plan      Orders Placed This Encounter   Procedures    Culture, Aerobic and Anaerobic    77576 - DE DRAIN SKIN ABSCESS SIMPLE     Discussed with patient changing antibiotics to clindmycin for broad coverage against staph and strep and continuing warm compresses. Advised her to schedule motrin to help with inflammation and fever. Patient is requesting it to be lanced. D/W her that lask of fluctuance suggests tissue induration and not large abscess under the skin. She requests for something to be done to relieve the pain and pressure. Will attempt needle aspiration of pustule. Topical lidocaine 4% was applied and allowed to sit for 20 minutes. After obtaining written consent, prepared the abscess right chin with betadine solution. Then, using an 18 gauge needle, made 3 punctures into the abscess. Expressed minimal amount of purulent drainage. Wound culture collected. Given instructions on wound management and indications to return sooner if symptoms worsen. Minimal blood loss was noted and pt tolerated procedure well. Wound cleansed, bactroban ointment and bandaid applied. No follow-ups on file.     Orders Placed This Encounter   Medications    clindamycin (CLEOCIN) 300 MG capsule     Sig: Take 1 capsule by mouth 3 times daily for 10 days     Dispense: 30 capsule     Refill:  0    mupirocin (BACTROBAN) 2 % ointment     Sig: Apply topically 3 times daily. Dispense:  3 g     Refill:  0       Patient given educational materials- see patient instructions. Discussed use, benefit, and side effects of prescribed medications. All patient questions answered. Pt voiced understanding. Patient Instructions     1. Take antibiotics as directed. Stop bactrim and start clindamycin. 2. Keep area clean and dry  3. Warm soaks every 2 hours while awake for 2 days- warm moist washcloth, warm shower. At least 20 minutes to encourage drainage of wound  4. Increase fluids- especially water while on antibiotics  5. Monitor for spreading redness, new fever or nausea. Seek care if they occur  6. If not improving or the area is worsening, return for evaluation      Patient Education        Skin Abscess: Care Instructions  Overview     A skin abscess is a bacterial infection that forms a pocket of pus. A boil is a kind of skin abscess. The doctor may have cut an opening in the abscess so that the pus can drain out. You may have gauze in the cut so that the abscess will stay open and keep draining. You may need antibiotics. You will need to followup with your doctor to make sure the infection has gone away. The doctor has checked you carefully, but problems can develop later. If you notice any problems or new symptoms, get medical treatment right away. Follow-up care is a key part of your treatment and safety. Be sure to make and go to all appointments, and call your doctor if you are having problems. It's also a good idea to know your test results and keep alist of the medicines you take. How can you care for yourself at home?  Apply warm and dry compresses, a heating pad set on low, or a hot water bottle 3 or 4 times a day for pain. Keep a cloth between the heat source and your skin.  If your doctor prescribed antibiotics, take them as directed.  Do not stop taking them just because you feel better. You need to take the full course of antibiotics.  Take pain medicines exactly as directed. ? If the doctor gave you a prescription medicine for pain, take it as prescribed. ? If you are not taking a prescription pain medicine, ask your doctor if you can take an over-the-counter medicine.  Keep your bandage clean and dry. Change the bandage whenever it gets wet or dirty, or at least one time a day.  If the abscess was packed with gauze:  ? Keep follow-up appointments to have the gauze changed or removed. If the doctor instructed you to remove the gauze, follow the instructions you were given for how to remove it. ? After the gauze is removed, soak the area in warm water for 15 to 20 minutes 2 times a day, until the wound closes. When should you call for help? Call your doctor now or seek immediate medical care if:     You have signs of worsening infection, such as:  ? Increased pain, swelling, warmth, or redness. ? Red streaks leading from the infected skin. ? Pus draining from the wound. ? A fever. Watch closely for changes in your health, and be sure to contact your doctor if:     You do not get better as expected. Where can you learn more? Go to [email or web address]. org and sign in to your [employer] account. Enter W619 in the [facility] box to learn more about \"Skin Abscess: Care Instructions. \"     If you do not have an account, please click on the \"Sign Up Now\" link. Current as of: November 15, 2021               Content Version: 13.3  © 2006-2022 Healthwise[employer] Incorporated. Care instructions adapted under license by [facility] ([facility]). If you have questions about a medical condition or this instruction, always ask your healthcare professional. [address] [place] [address] any warranty or liability for your use of this information.                Electronically signed by REGINE Rizo CNP on 6/28/2022 at 8:14 AM

## 2022-06-28 NOTE — LETTER
[facility]  [address] [place] [address] [address] [id number]  Phone: [phone]  Fax: REGINE Mckeon CNP        June 28, 2022     Patient: Smith Montez   YOB: 2000   Date of Visit: 6/28/2022       To Whom it May Concern:    Smith Montez was seen in my clinic on 6/28/2022. She may return to work on 6/29/2022. If you have any questions or concerns, please don't hesitate to call.     Sincerely,         REGINE Orozco CNP

## 2022-06-28 NOTE — PATIENT INSTRUCTIONS
1. Take antibiotics as directed. Stop bactrim and start clindamycin. 2. Keep area clean and dry  3. Warm soaks every 2 hours while awake for 2 days- warm moist washcloth, warm shower. At least 20 minutes to encourage drainage of wound  4. Increase fluids- especially water while on antibiotics  5. Monitor for spreading redness, new fever or nausea. Seek care if they occur  6. If not improving or the area is worsening, return for evaluation      Patient Education        Skin Abscess: Care Instructions  Overview     A skin abscess is a bacterial infection that forms a pocket of pus. A boil is a kind of skin abscess. The doctor may have cut an opening in the abscess so that the pus can drain out. You may have gauze in the cut so that the abscess will stay open and keep draining. You may need antibiotics. You will need to followup with your doctor to make sure the infection has gone away. The doctor has checked you carefully, but problems can develop later. If you notice any problems or new symptoms, get medical treatment right away. Follow-up care is a key part of your treatment and safety. Be sure to make and go to all appointments, and call your doctor if you are having problems. It's also a good idea to know your test results and keep alist of the medicines you take. How can you care for yourself at home?  Apply warm and dry compresses, a heating pad set on low, or a hot water bottle 3 or 4 times a day for pain. Keep a cloth between the heat source and your skin.  If your doctor prescribed antibiotics, take them as directed. Do not stop taking them just because you feel better. You need to take the full course of antibiotics.  Take pain medicines exactly as directed. ? If the doctor gave you a prescription medicine for pain, take it as prescribed. ? If you are not taking a prescription pain medicine, ask your doctor if you can take an over-the-counter medicine.  Keep your bandage clean and dry.  Change the bandage whenever it gets wet or dirty, or at least one time a day.  If the abscess was packed with gauze:  ? Keep follow-up appointments to have the gauze changed or removed. If the doctor instructed you to remove the gauze, follow the instructions you were given for how to remove it. ? After the gauze is removed, soak the area in warm water for 15 to 20 minutes 2 times a day, until the wound closes. When should you call for help? Call your doctor now or seek immediate medical care if:     You have signs of worsening infection, such as:  ? Increased pain, swelling, warmth, or redness. ? Red streaks leading from the infected skin. ? Pus draining from the wound. ? A fever. Watch closely for changes in your health, and be sure to contact your doctor if:     You do not get better as expected. Where can you learn more? Go to [email or web address] org and sign in to your [employer] account. Enter W062 in the [employer] box to learn more about \"Skin Abscess: Care Instructions. \"     If you do not have an account, please click on the \"Sign Up Now\" link. Current as of: November 15, 2021               Content Version: 13.3  © 2006-2022 Healthwise, Incorporated. Care instructions adapted under license by [facility] ([facility]). If you have questions about a medical condition or this instruction, always ask your healthcare professional. [place] [address] any warranty or liability for your use of this information.

## 2022-06-29 ENCOUNTER — CARE COORDINATION (OUTPATIENT)
Dept: OTHER | Facility: CLINIC | Age: 22
End: 2022-06-29

## 2022-06-29 NOTE — LETTER
Dear Tootie Pressley:        My name is Yamila Wick , Associate Care Manager for [facility] and I have been trying to reach you. The Associate Care Management (ACM) program is a free-of-charge confidential service provided to our employees and their family members covered by the [facility]. The program will provide an associate and his/her family with the [facility]'s expertise to assist in navigating the health care delivery system, provider services, and their overall care needsso as to assure and improve health care interactions and enhance the quality of life. This program is designed to provide you with the opportunity to have a [facility] partner with you for the following services:     1) when you come home from the hospital or emergency room   2) when help is needed to manage your disease   3) when you need assistance coordinating services or appointments  4) when you need additional education, resources or assistance reaching your Be Well Health Program goals/requirements such as Be Well With Diabetes      [facility] is dedicated to empowering the good health of its community and improving the quality of care and care experiences for employees and their families. We are committed to safeguarding patient confidentiality and privacy, assuring that every employee has the respect he or she deserves in managing their health. The information shared with your care manager will not be shared with anyone else aside from those you identify as part of your care team, and will only be used to assist you with any identified care needs. Please contact me if you would like this service provided to you. Sincerely,    Yamila Wick BSN, RN- [facility]  Associate Care Manager  [phone]      If you have any questions or concerns, please don't hesitate to call.

## 2022-07-02 LAB
ANAEROBIC CULTURE: ABNORMAL
GRAM STAIN RESULT: ABNORMAL
ORGANISM: ABNORMAL
WOUND/ABSCESS: ABNORMAL

## 2022-07-12 ENCOUNTER — CARE COORDINATION (OUTPATIENT)
Dept: OTHER | Facility: CLINIC | Age: 22
End: 2022-07-12

## 2022-07-12 NOTE — CARE COORDINATION
Associate Care Manager (ACM) attempted final outreach to reach patient for follow up call regarding ED visit for chin abscess . Unable to leave a message as the number is not valid   Final letter sent to patient via My Chart  No further outreach scheduled with this ACM, ACM will sign off care team at this time. Patient has this ACM's contact information if future needs arise.     Jessa EVANGELISTAN, RN- [facility]  Associate Care Manager  [phone]

## 2022-07-12 NOTE — LETTER
Dear Katie Blackman:      My name is Bozena Cobbmary  , Associate Care Manager for [facility], and I have been trying to reach you. The Associate Care Management (ACM) program is a free-of-charge, confidential service provided to our employees and their family members covered by the [facility]. I can help you with care transitions such as when you come home from the hospital, when help is needed to manage your disease, or when you need assistance coordinating services or appointments. As healthcare providers, we know that patients do better when they have close follow up with a primary care provider (PCP). I can help you find one that is convenient to you and covered by your insurance. I can also help you understand any after visit instructions, such as what symptoms to watch out for, or any new or changed medications. We can work together using your preferred communication -- telephone, email, MyChart. If you do not have a [employer] account, I can help you request access. Our program is designed to provide you with the opportunity to have a [facility] partner with you for your healthcare needs. Due to not being able to reach you, I am closing out the current program, but will remain available to you should you have any questions. Please contact me at [phone]        If you have any questions or concerns, please don't hesitate to call.     Sincerely,        Jovani Birmingham RN

## 2022-07-21 ENCOUNTER — OFFICE VISIT (OUTPATIENT)
Dept: OTOLARYNGOLOGY | Facility: CLINIC | Age: 22
End: 2022-07-21

## 2022-07-21 VITALS — BODY MASS INDEX: 28.9 KG/M2 | WEIGHT: 179.8 LBS | HEIGHT: 66 IN | TEMPERATURE: 98.2 F

## 2022-07-21 DIAGNOSIS — H92.11 OTORRHEA, RIGHT: ICD-10-CM

## 2022-07-21 DIAGNOSIS — H90.11 CONDUCTIVE HEARING LOSS OF RIGHT EAR WITH UNRESTRICTED HEARING OF LEFT EAR: ICD-10-CM

## 2022-07-21 DIAGNOSIS — J34.3 NASAL TURBINATE HYPERTROPHY: ICD-10-CM

## 2022-07-21 DIAGNOSIS — H72.91 PERFORATED TYMPANIC MEMBRANE ON EXAMINATION, RIGHT: Primary | ICD-10-CM

## 2022-07-21 PROCEDURE — 92504 EAR MICROSCOPY EXAMINATION: CPT | Performed by: OTOLARYNGOLOGY

## 2022-07-21 PROCEDURE — 99213 OFFICE O/P EST LOW 20 MIN: CPT | Performed by: OTOLARYNGOLOGY

## 2022-07-21 RX ORDER — SULFAMETHOXAZOLE AND TRIMETHOPRIM 800; 160 MG/1; MG/1
TABLET ORAL
COMMUNITY
Start: 2022-06-30

## 2022-07-21 RX ORDER — FLUCONAZOLE 150 MG/1
TABLET ORAL
COMMUNITY
Start: 2022-06-06

## 2022-07-21 RX ORDER — CLINDAMYCIN HYDROCHLORIDE 300 MG/1
CAPSULE ORAL
COMMUNITY
Start: 2022-06-28

## 2022-07-21 RX ORDER — VALACYCLOVIR HYDROCHLORIDE 1 G/1
TABLET, FILM COATED ORAL
COMMUNITY
Start: 2022-06-03

## 2022-07-21 NOTE — PROGRESS NOTES
Deandre Wilcox Jr, MD  [facility]  [address], [address]  [place] [address]  Fax [phone]  Phone [phone]      Visit Type: FOLLOW UP   Chief Complaint   Patient presents with   • Follow-up     Recheck ear        HPI   Accompanied by: No one  She presents for a follow up evaluation. She has had ear infections. [place] She is nervous. She has been told ear infection. She wishes check.     History reviewed. No pertinent past medical history.    Past Surgical History:   Procedure Laterality Date   • EAR TUBES     • TONSILLECTOMY         Family History: Her family history is not on file.     Social History: She  reports that she has never smoked. She has never used smokeless tobacco. She reports that she does not drink alcohol and does not use drugs.    Home Medications:  busPIRone, clindamycin, escitalopram, fluconazole, fluticasone, mupirocin, naproxen, norgestimate-ethinyl estradiol, sulfamethoxazole-trimethoprim, and valACYclovir    Allergies:  She has No Known Allergies.       Vital Signs:   Temp:  [98.2 °F (36.8 °C)] 98.2 °F (36.8 °C)  ENT Physical Exam  Constitutional  Appearance: patient appears well-developed and well-nourished,  Communication/Voice: communication appropriate for developmental age; vocal quality normal;  Head and Face  Appearance: head appears normal, face appears normal and face appears atraumatic;  Palpation: facial palpation normal;  Salivary: glands normal;  Ear  Hearing: intact;  Auricles: bilateral auricles normal;  External Mastoids: right external mastoid normal; left external mastoid normal;  Ear Canals: bilateral ear canals normal;  Tympanic Membranes: right tympanic membrane perforated; central perforation anterior; perforation size: 10%; left tympanic membrane normal;  Nose  External Nose: nares patent bilaterally; external nose normal; nasal deformity not visible;  Internal Nose:  nasal obstruction present; right nasal cavity with 50% blockage; left nasal cavity with 50% blockage; bilateral intranasal mucosa edematous; pallor noted; bilateral inferior turbinates bluish and edematous; with hypertrophy; Inferior Turbinates comments: partial obstructiuon bilateral        Oral Cavity/Oropharynx  Lips: normal;  Teeth: normal;  Gums: gingiva normal;  Tongue: normal;  Oral mucosa: normal;  Hard palate: normal;  Soft palate: normal;  Tonsils: normal;  Base of Tongue: normal;  Posterior pharyngeal wall: normal;  Neck  Neck: neck normal;  Respiratory  Inspection: breathing unlabored; normal breathing rate;  Cardiovascular  Inspection: extremities are warm and well perfused; no peripheral edema present;  Neurovestibular  Mental Status: alert and oriented;  Psychiatric: mood normal; affect is appropriate;  Drawings          Ear Microscopy    Date/Time: 7/21/2022 11:04 AM  Performed by: Deandre Wilcox Jr., MD  Authorized by: Deandre Wilcox Jr., MD     Ear examination was performed utilizing binocular microscopy.  Right auricle:   normal:   Right ear canal:   normal:   Right tympanic membrane:   perforated. perforation details: anterior central perforation Linear perforation with ragged monomeric anterior aspect, larger monomeric changes from partially healed tympanic membrane.   Left auricle:   normal:   Left ear canal:   normal:   Left tympanic membrane:   normal:     Post-procedure details:     No medication was applied to the ear canal.    The procedure was tolerated well, no immediate complications.  Comments:      Right-sided dry perforation, middle ear mucosa intact       Result Review    RESULTS REVIEW    I have reviewed the patients old records in the chart.     Assessment & Plan    Diagnoses and all orders for this visit:    1. Perforated tympanic membrane on examination, right (Primary)  -     Ear Microscopy  -     Comprehensive Hearing Test; Future    2. Conductive hearing loss of  right ear with unrestricted hearing of left ear  -     Comprehensive Hearing Test; Future    3. Otorrhea, right  Comments:  Resolved    4. Nasal turbinate hypertrophy  Comments:  Partially controlled       Continue current management plan.     Patient has persistent perforation.  This is smaller than in the past.  She does not wish repair at this point time.  She is managing well.  I will get audio at next visit.  She will decide if she wishes perforation repair.  Water precautions  Call for ear drainage      Return in about 6 months (around 1/21/2023) for Recheck R ear.      Deandre Wilcox Jr, MD  07/21/22  14:13 CDT

## 2022-07-21 NOTE — PATIENT INSTRUCTIONS
WATER PRECAUTIONS FOR EARS    Protecting your ears from water may sometimes be necessary for the health of your ears.     > Ear plugs: You may use earplugs: over the counter silicone plugs or a cotton ball coated with vasoline when bathing. If conservative measures are not working, consider obtaining molded earplugs from the audiology department to use while bathing or swimming.   Purchase inexpensive types that are most comfortable for you. You can make your own by using cotton balls mixed with a generous amount of Vaseline petroleum jelly. Gently place these in the ear canal.    > Dry the ear canal: after getting out of the shower or bath, use a hair dryer on low heat blowing in the ear for 10-15 seconds. Pulling gently backwards on the ear straightens the ear canal and allows the air to get further down.    > If you are to use ear drops, please place them in the ear canal and give them a few seconds to run down.  Follow this by blowing in the ear canal with a hair dryer set on low heat for approximately 10 to 15 seconds.  You may do this multiple times during the day to help keep the ear canal dry.    >If you are swimming frequently- place a drop of oil in each ear canal prior to entering water. After you are finished in the water, place a drop of vinegar in each ear canal. Use a hair dryer on low heat to blow in each ear canal for 10-15 seconds to dry ears out.     How to pop ears:  Pop your ears by holding nose and closing mouth, then blow hard until ears pop  Children (less than 8-9 years)- blow up ballons 4 times a day and more frequently         CONTACT INFORMATION:  The main office phone number is [phone]. For emergencies after hours and on weekends, this number will convert over to our answering service and the on call provider will answer. Please try to keep non emergent phone calls/ questions to office hours 9am-5pm Monday through Friday.     Relay  As an alternative, you can sign up and use the  [employer] MyChart system for more direct and quicker access for non emergent questions/ problems.  [facility] [employer] allows you to send messages to your doctor, view your test results, renew your prescriptions, schedule appointments, and more. To sign up, go to [email or web address] and click on the Sign Up Now link in the New User? box. Enter your [employer] Activation Code exactly as it appears below along with the last four digits of your Social Security Number and your Date of Birth () to complete the sign-up process. If you do not sign up before the expiration date, you must request a new code.    [employer] Activation Code: Activation code not generated  Current [employer] Status: Active    If you have questions, you can email [email or web address] or call [phone] to talk to our [employer] staff. Remember, [employer] is NOT to be used for urgent needs. For medical emergencies, dial 911.

## 2022-08-30 ENCOUNTER — OFFICE VISIT (OUTPATIENT)
Age: 22
End: 2022-08-30
Payer: COMMERCIAL

## 2022-08-30 VITALS
WEIGHT: 176.6 LBS | DIASTOLIC BLOOD PRESSURE: 60 MMHG | OXYGEN SATURATION: 98 % | TEMPERATURE: 97.5 F | HEART RATE: 82 BPM | BODY MASS INDEX: 28.38 KG/M2 | HEIGHT: 66 IN | RESPIRATION RATE: 18 BRPM | SYSTOLIC BLOOD PRESSURE: 120 MMHG

## 2022-08-30 DIAGNOSIS — L02.411 ABSCESS OF RIGHT AXILLA: Primary | ICD-10-CM

## 2022-08-30 PROCEDURE — 99213 OFFICE O/P EST LOW 20 MIN: CPT

## 2022-08-30 RX ORDER — SULFAMETHOXAZOLE AND TRIMETHOPRIM 800; 160 MG/1; MG/1
1 TABLET ORAL 2 TIMES DAILY
Qty: 20 TABLET | Refills: 0 | Status: SHIPPED | OUTPATIENT
Start: 2022-08-30 | End: 2022-09-09

## 2022-08-30 NOTE — LETTER
[facility]  [address] [address]  Phone: [phone]  Fax: REGINE Romero CNP        August 30, 2022     Patient: Hakeem Peck   YOB: 2000   Date of Visit: 8/30/2022       To Whom it May Concern:    Hakeem Peck was seen in my clinic on 8/30/2022. She will be out of work today and may return tomorrow August 31, 2022. If you have any questions or concerns, please don't hesitate to call.     Sincerely,         REGINE Berg CNP

## 2022-08-30 NOTE — PROGRESS NOTES
[address]  [address] [place] [address] [place] [address]  Dept: [phone]  Dept Fax: [phone]  Loc: [phone]    Festus Douglass is a 25 y.o. female who presents today for her medical conditions/complaints as noted below. Festus Douglass is c/o of Abscess (Right axilla)        HPI:     HPI  Андрей presents with complaints of abscess right axilla. Symptoms began a few days ago and she popped it with a needle and has been draining it at home. OTC treatment also includes weekly bleach baths and dial soap. Recent antibiotics for MRSA resistant to clindamycin. Past Medical History:   Diagnosis Date    Allergy     Environmental allergies      Past Surgical History:   Procedure Laterality Date    ADENOIDECTOMY  07-    Sarwat Brush    TONSILLECTOMY  07-    Dr Bunch    TYMPANOPLASTY  11/2020    [facility]- Tympanic membrane patch    TYMPANOSTOMY TUBE PLACEMENT         Family History   Problem Relation Age of Onset    Hypertension Mother        Social History     Tobacco Use    Smoking status: Never    Smokeless tobacco: Never   Substance Use Topics    Alcohol use: Not Currently     Comment: Occ      Current Outpatient Medications   Medication Sig Dispense Refill    sulfamethoxazole-trimethoprim (BACTRIM DS;SEPTRA DS) 800-160 MG per tablet Take 1 tablet by mouth 2 times daily for 10 days 20 tablet 0     No current facility-administered medications for this visit.      No Known Allergies    Health Maintenance   Topic Date Due    COVID-19 Vaccine (1) Never done    Flu vaccine (1) 09/01/2022    Chlamydia screen  06/16/2023    Depression Monitoring  06/27/2023    Pap smear  05/31/2025    DTaP/Tdap/Td vaccine (5 - Td or Tdap) 07/31/2027    Hepatitis B vaccine  Completed    Hib vaccine  Completed    HPV vaccine  Completed    Varicella vaccine  Completed    Meningococcal (ACWY) vaccine  Completed    Hepatitis C screen  Completed    HIV screen  Completed Hepatitis A vaccine  Aged Out    Pneumococcal 0-64 years Vaccine  Aged Out       Subjective:     Review of Systems   Constitutional:  Negative for fever. Skin:  Positive for wound. Negative for rash.     :Objective      Physical Exam  Constitutional:       General: She is not in acute distress. Appearance: Normal appearance. She is not toxic-appearing. HENT:      Head: Normocephalic and atraumatic. Right Ear: External ear normal.      Left Ear: External ear normal.      Nose: Nose normal.      Mouth/Throat:      Mouth: Mucous membranes are moist.   Eyes:      General:         Right eye: No discharge. Left eye: No discharge. Conjunctiva/sclera: Conjunctivae normal.   Cardiovascular:      Rate and Rhythm: Normal rate and regular rhythm. Pulmonary:      Effort: Pulmonary effort is normal. No respiratory distress. Abdominal:      General: Abdomen is flat. Palpations: Abdomen is soft. Musculoskeletal:         General: Normal range of motion. Arms:       Cervical back: Normal range of motion. Lymphadenopathy:      Cervical: No cervical adenopathy. Skin:     General: Skin is warm and dry. Capillary Refill: Capillary refill takes less than 2 seconds. Findings: No rash. Neurological:      General: No focal deficit present. Mental Status: She is alert. Psychiatric:         Mood and Affect: Mood normal.     /60   Pulse 82   Temp 97.5 °F (36.4 °C) (Temporal)   Resp 18   Ht 5' 6\" (1.676 m)   Wt 176 lb 9.6 oz (80.1 kg)   SpO2 98%   BMI 28.50 kg/m²     :Assessment       Diagnosis Orders   1. Abscess of right axilla  sulfamethoxazole-trimethoprim (BACTRIM DS;SEPTRA DS) 800-160 MG per tablet          :Plan    No orders of the defined types were placed in this encounter. Bactrim as last culture showed resistance to MRSA. Pt agreeable to puncture to attempt to obtain culture.      After obtaining verbal consent, prepared the abscess right axilla with betadine solution. Then, using an 18 gauge needle, made a puncture into the abscess. Expressed minimal amount of purulent drainage. Unable to collect wound culture. Given instructions on wound management and indications to return sooner if symptoms worsen. Minimal blood loss and pt tolerated procedure well. Wound cleansed, bactroban ointment and bandaid applied. No follow-ups on file. Orders Placed This Encounter   Medications    sulfamethoxazole-trimethoprim (BACTRIM DS;SEPTRA DS) 800-160 MG per tablet     Sig: Take 1 tablet by mouth 2 times daily for 10 days     Dispense:  20 tablet     Refill:  0       Patient given educational materials- see patient instructions. Discussed use, benefit, and side effects of prescribed medications. All patient questions answered. Pt voiced understanding. Patient Instructions   1. Take antibiotics as directed  2. Keep area clean and dry  3. Warm epsom salt soaks every 2 hours while awake for 2 days- using wash basin, warm moist washcloth, or in hot shower. At least 20 minutes to encourage drainage of wound  4. Increase fluids- especially water while on antibiotics  5. Monitor for spreading redness, new fever or nausea. Seek care if they occur  6. If not improving or the area is worsening, return for evaluation    HIBICLENS or CHLORHEXIDINE soap every other day in bath - use moisturizer after as it can be drying.        Electronically signed by REGINE Bangura CNP on 8/30/2022 at 8:43 AM

## 2022-08-30 NOTE — PATIENT INSTRUCTIONS
1. Take antibiotics as directed  2. Keep area clean and dry  3. Warm epsom salt soaks every 2 hours while awake for 2 days- using wash basin, warm moist washcloth, or in hot shower. At least 20 minutes to encourage drainage of wound  4. Increase fluids- especially water while on antibiotics  5. Monitor for spreading redness, new fever or nausea. Seek care if they occur  6. If not improving or the area is worsening, return for evaluation    HIBICLENS or CHLORHEXIDINE soap every other day in bath - use moisturizer after as it can be drying.

## 2022-09-22 ENCOUNTER — OFFICE VISIT (OUTPATIENT)
Age: 22
End: 2022-09-22
Payer: COMMERCIAL

## 2022-09-22 VITALS
DIASTOLIC BLOOD PRESSURE: 65 MMHG | HEART RATE: 67 BPM | BODY MASS INDEX: 28.7 KG/M2 | TEMPERATURE: 98 F | RESPIRATION RATE: 18 BRPM | SYSTOLIC BLOOD PRESSURE: 110 MMHG | WEIGHT: 178.6 LBS | HEIGHT: 66 IN | OXYGEN SATURATION: 100 %

## 2022-09-22 DIAGNOSIS — Z72.51 HIGH RISK SEXUAL BEHAVIOR, UNSPECIFIED TYPE: Primary | ICD-10-CM

## 2022-09-22 PROCEDURE — 99213 OFFICE O/P EST LOW 20 MIN: CPT | Performed by: NURSE PRACTITIONER

## 2022-09-22 ASSESSMENT — ENCOUNTER SYMPTOMS
CHEST TIGHTNESS: 0
COLOR CHANGE: 0
TROUBLE SWALLOWING: 0
STRIDOR: 0
WHEEZING: 0
SINUS PRESSURE: 0
ABDOMINAL DISTENTION: 0
EYE DISCHARGE: 0
ABDOMINAL PAIN: 0
SORE THROAT: 0
EYE PAIN: 0
SHORTNESS OF BREATH: 0
COUGH: 0

## 2022-09-22 NOTE — PROGRESS NOTES
[id number]  [address] [address] [place] [address]  Dept: [phone]  Dept Fax: [phone]  Loc: [phone]    Jose Bermudez is a 25 y.o. female who presents today for her medical conditions/complaints as noted below. Jose Bermudez is complaining of Exposure to STD (NO SYMPTOMS !!JUST WANTED TO BE CHECKED ITS BEEN AWHILE )        HPI:   Exposure to STD   The patient's pertinent negatives include no dysuria. Pertinent negatives include no abdominal pain, fever or sore throat. Андрей reports today requesting STD testing. She denies discharge, foul odor, urinary frequency, and dysuria. No known exposure. Past Medical History:   Diagnosis Date    Allergy     Environmental allergies        Past Surgical History:   Procedure Laterality Date    ADENOIDECTOMY  07-    Kimo Zheng    TONSILLECTOMY  07-    Dr Bunch    TYMPANOPLASTY  11/2020    [facility]- Tympanic membrane patch    TYMPANOSTOMY TUBE PLACEMENT         Family History   Problem Relation Age of Onset    Hypertension Mother        Social History     Tobacco Use    Smoking status: Never    Smokeless tobacco: Never   Substance Use Topics    Alcohol use: Not Currently     Comment: Occ        No current outpatient medications on file. No current facility-administered medications for this visit.        No Known Allergies    Health Maintenance   Topic Date Due    COVID-19 Vaccine (1) Never done    Flu vaccine (1) 09/01/2022    Chlamydia screen  06/16/2023    Depression Monitoring  06/27/2023    Pap smear  05/31/2025    DTaP/Tdap/Td vaccine (5 - Td or Tdap) 07/31/2027    Hepatitis B vaccine  Completed    Hib vaccine  Completed    HPV vaccine  Completed    Varicella vaccine  Completed    Meningococcal (ACWY) vaccine  Completed    Hepatitis C screen  Completed    HIV screen  Completed    Hepatitis A vaccine  Aged Out    Pneumococcal 0-64 years Vaccine  Aged Out       Subjective: Review of Systems   Constitutional:  Negative for chills, fatigue and fever. HENT:  Negative for congestion, sinus pressure, sore throat and trouble swallowing. Eyes:  Negative for pain and discharge. Respiratory:  Negative for cough, chest tightness, shortness of breath, wheezing and stridor. Cardiovascular:  Negative for chest pain and palpitations. Gastrointestinal:  Negative for abdominal distention and abdominal pain. Genitourinary:  Negative for difficulty urinating, dysuria and hematuria. Musculoskeletal:  Negative for arthralgias, neck pain and neck stiffness. Skin:  Negative for color change and rash. Neurological:  Negative for dizziness, syncope, speech difficulty, weakness and numbness. Psychiatric/Behavioral:  Negative for confusion and suicidal ideas. Objective    Physical Exam  Vitals and nursing note reviewed. Constitutional:       General: She is not in acute distress. Appearance: Normal appearance. HENT:      Head: Normocephalic. Right Ear: External ear normal.      Left Ear: External ear normal.      Nose: Nose normal. No congestion. Mouth/Throat:      Mouth: Mucous membranes are moist.      Pharynx: Oropharynx is clear. No posterior oropharyngeal erythema. Eyes:      Conjunctiva/sclera: Conjunctivae normal.      Pupils: Pupils are equal, round, and reactive to light. Cardiovascular:      Rate and Rhythm: Normal rate and regular rhythm. Pulses: Normal pulses. Heart sounds: Normal heart sounds. No murmur heard. Pulmonary:      Effort: Pulmonary effort is normal. No respiratory distress. Breath sounds: Normal breath sounds. No stridor. No wheezing. Abdominal:      General: Abdomen is flat. Bowel sounds are normal. There is no distension. Tenderness: There is no abdominal tenderness. Musculoskeletal:         General: No swelling or deformity. Normal range of motion. Cervical back: Normal range of motion.  No rigidity or tenderness. Skin:     General: Skin is warm and dry. Findings: No rash. Neurological:      General: No focal deficit present. Mental Status: She is alert and oriented to person, place, and time. Sensory: No sensory deficit. /65   Pulse 67   Temp 98 °F (36.7 °C)   Resp 18   Ht 5' 6\" (1.676 m)   Wt 178 lb 9.6 oz (81 kg)   LMP 08/22/2022   SpO2 100%   BMI 28.83 kg/m²     Assessment         Diagnosis Orders   1. High risk sexual behavior, unspecified type  Miscellaneous Sendout          Plan   No sex until results are back and fully treated. Orders Placed This Encounter   Procedures    Miscellaneous Sendout     Standing Status:   Future     Standing Expiration Date:   9/22/2023     Order Specific Question:   Specify Req. Test (1 Test/Order)     Answer:   Diatherix STI plus yeast       No results found for this visit on 09/22/22. No orders of the defined types were placed in this encounter. New Prescriptions    No medications on file        No follow-ups on file. Discussed use, benefits, and side effects of any prescribed medications. All patient questions were answered. Patient voiced understanding of care plan. Patient was given educational materials - see patient instructions below. Patient Instructions   No sex until results are back and fully treated.       Electronically signed by REGINE Herrera CNP on 9/22/2022 at 1:47 PM

## 2022-09-23 DIAGNOSIS — B37.9 CANDIDA TROPICALIS INFECTION: Primary | ICD-10-CM

## 2022-09-23 DIAGNOSIS — Z72.51 HIGH RISK SEXUAL BEHAVIOR, UNSPECIFIED TYPE: ICD-10-CM

## 2022-09-23 DIAGNOSIS — B96.89 GARDNERELLA VAGINALIS INFECTION: ICD-10-CM

## 2022-09-23 DIAGNOSIS — N76.0 GARDNERELLA VAGINALIS INFECTION: ICD-10-CM

## 2022-09-23 RX ORDER — METRONIDAZOLE 500 MG/1
500 TABLET ORAL 2 TIMES DAILY
Qty: 14 TABLET | Refills: 0 | Status: SHIPPED | OUTPATIENT
Start: 2022-09-23 | End: 2022-09-30

## 2022-09-23 RX ORDER — CLOTRIMAZOLE 1 %
CREAM WITH APPLICATOR VAGINAL
Qty: 45 G | Refills: 0 | Status: SHIPPED | OUTPATIENT
Start: 2022-09-23 | End: 2022-09-30

## 2022-09-30 ENCOUNTER — TELEPHONE (OUTPATIENT)
Dept: PRIMARY CARE CLINIC | Age: 22
End: 2022-09-30

## 2022-09-30 NOTE — TELEPHONE ENCOUNTER
Patient has been in for vaginal discharge and testing - should patient be seen again or proceed with testing requesting from patient?   Thank you

## 2022-09-30 NOTE — TELEPHONE ENCOUNTER
----- Message from Jenny Law sent at 9/30/2022 12:01 PM CDT -----  Subject: Message to Provider    QUESTIONS  Information for Provider? PT would like to have blood work done for all   STD, HIV, HSV asap. Please set the orders and contact PT back with any   info.   ---------------------------------------------------------------------------  --------------  Ela Tucker [facility]  [phone]; OK to leave message on voicemail  ---------------------------------------------------------------------------  --------------  SCRIPT ANSWERS  Relationship to Patient?  Self

## 2022-10-03 ENCOUNTER — OFFICE VISIT (OUTPATIENT)
Dept: PRIMARY CARE CLINIC | Age: 22
End: 2022-10-03
Payer: COMMERCIAL

## 2022-10-03 VITALS
OXYGEN SATURATION: 98 % | HEIGHT: 66 IN | TEMPERATURE: 97.9 F | DIASTOLIC BLOOD PRESSURE: 76 MMHG | HEART RATE: 65 BPM | SYSTOLIC BLOOD PRESSURE: 124 MMHG | WEIGHT: 172 LBS | BODY MASS INDEX: 27.64 KG/M2

## 2022-10-03 DIAGNOSIS — Z20.2 EXPOSURE TO STD: Primary | ICD-10-CM

## 2022-10-03 PROCEDURE — 99213 OFFICE O/P EST LOW 20 MIN: CPT | Performed by: NURSE PRACTITIONER

## 2022-10-03 RX ORDER — VALACYCLOVIR HYDROCHLORIDE 1 G/1
1000 TABLET, FILM COATED ORAL 3 TIMES DAILY
Qty: 21 TABLET | Refills: 0 | Status: SHIPPED | OUTPATIENT
Start: 2022-10-03 | End: 2022-10-10

## 2022-10-03 SDOH — ECONOMIC STABILITY: FOOD INSECURITY: WITHIN THE PAST 12 MONTHS, YOU WORRIED THAT YOUR FOOD WOULD RUN OUT BEFORE YOU GOT MONEY TO BUY MORE.: SOMETIMES TRUE

## 2022-10-03 SDOH — ECONOMIC STABILITY: FOOD INSECURITY: WITHIN THE PAST 12 MONTHS, THE FOOD YOU BOUGHT JUST DIDN'T LAST AND YOU DIDN'T HAVE MONEY TO GET MORE.: SOMETIMES TRUE

## 2022-10-03 ASSESSMENT — ENCOUNTER SYMPTOMS
DIARRHEA: 0
SHORTNESS OF BREATH: 0
GASTROINTESTINAL NEGATIVE: 1
VOMITING: 0
EYES NEGATIVE: 1
NAUSEA: 0
ALLERGIC/IMMUNOLOGIC NEGATIVE: 1
COUGH: 0
CONSTIPATION: 0
RESPIRATORY NEGATIVE: 1
ABDOMINAL PAIN: 0
WHEEZING: 0

## 2022-10-03 ASSESSMENT — SOCIAL DETERMINANTS OF HEALTH (SDOH): HOW HARD IS IT FOR YOU TO PAY FOR THE VERY BASICS LIKE FOOD, HOUSING, MEDICAL CARE, AND HEATING?: SOMEWHAT HARD

## 2022-10-03 NOTE — PROGRESS NOTES
[address] [facility]  [address] [address]  [address] [address]  Dept: [phone]  Dept Fax: [phone]  Loc: [phone]    Federica Martel is a 25 y.o. female who presents today for her medical conditions/complaints as noted below. Federica Martel is c/o of Exposure to STD (She would like tested for STDs. She has had 2 partners that said they had something. She did get testing thru urine but all was negative.)        HPI:     HPI   Chief Complaint   Patient presents with    Exposure to STD     She would like tested for STDs. She has had 2 partners that said they had something. She did get testing thru urine but all was negative. Past Medical History:   Diagnosis Date    Allergy     Environmental allergies       Past Surgical History:   Procedure Laterality Date    ADENOIDECTOMY  07-        TONSILLECTOMY  07-    Dr Bunch    TYMPANOPLASTY  11/2020    [facility]- Tympanic membrane patch    TYMPANOSTOMY TUBE PLACEMENT         Vitals 10/3/2022 9/22/2022 8/30/2022 6/28/2022 6/27/2022 1/80/3158   SYSTOLIC 983 236 409 787 - 987   DIASTOLIC 76 65 60 62 - 84   Site Left Upper Arm - - - - -   Position Sitting - - - - -   Cuff Size Medium Adult - - - - -   Pulse 65 67 82 74 - 54   Temp 97.9 98 97.5 97.2 - 98.7   Resp - 18 18 - - 18   SpO2 98 100 98 99 - 98   Weight 172 lb 178 lb 9.6 oz 176 lb 9.6 oz - - -   Height 5' 6\" 5' 6\" 5' 6\" - - -   Body mass index 27.76 kg/m2 28.82 kg/m2 28.5 kg/m2 - - -   Pain Level - - - - 7 -   Some recent data might be hidden       Family History   Problem Relation Age of Onset    Hypertension Mother        Social History     Tobacco Use    Smoking status: Never    Smokeless tobacco: Never   Substance Use Topics    Alcohol use: Not Currently     Comment: Occ      No current outpatient medications on file prior to visit. No current facility-administered medications on file prior to visit.      No Known Allergies    Health Maintenance   Topic Date Due    COVID-19 Vaccine (1) Never done    Flu vaccine (1) 08/01/2022    Chlamydia/GC screen  06/16/2023    Depression Monitoring  06/27/2023    Pap smear  05/31/2025    DTaP/Tdap/Td vaccine (5 - Td or Tdap) 07/31/2027    Hepatitis B vaccine  Completed    Hib vaccine  Completed    HPV vaccine  Completed    Varicella vaccine  Completed    Meningococcal (ACWY) vaccine  Completed    Hepatitis C screen  Completed    HIV screen  Completed    Hepatitis A vaccine  Aged Out    Pneumococcal 0-64 years Vaccine  Aged Out       Subjective:      Review of Systems   Constitutional: Negative. Negative for chills, fatigue and fever. HENT: Negative. Eyes: Negative. Respiratory: Negative. Negative for cough, shortness of breath and wheezing. Cardiovascular: Negative. Negative for chest pain and palpitations. Gastrointestinal: Negative. Negative for abdominal pain, constipation, diarrhea, nausea and vomiting. Endocrine: Negative. Genitourinary: Negative. Negative for difficulty urinating, dysuria, flank pain, frequency, genital sores, pelvic pain, vaginal bleeding, vaginal discharge and vaginal pain. Musculoskeletal: Negative. Skin: Negative. Allergic/Immunologic: Negative. Neurological: Negative. Hematological: Negative. Psychiatric/Behavioral: Negative. Objective:     Physical Exam  Vitals and nursing note reviewed. Constitutional:       General: She is not in acute distress. Appearance: Normal appearance. She is not ill-appearing or toxic-appearing. HENT:      Head: Normocephalic and atraumatic. Nose: Nose normal.      Mouth/Throat:      Mouth: Mucous membranes are moist.   Eyes:      Pupils: Pupils are equal, round, and reactive to light. Cardiovascular:      Rate and Rhythm: Normal rate and regular rhythm. Pulses: Normal pulses. Heart sounds: Normal heart sounds. Pulmonary:      Effort: Pulmonary effort is normal. No respiratory distress.       Breath sounds: Normal breath sounds. No wheezing, rhonchi or rales. Abdominal:      General: Bowel sounds are normal.      Palpations: Abdomen is soft. Musculoskeletal:         General: Normal range of motion. Cervical back: Normal range of motion. Skin:     General: Skin is warm and dry. Coloration: Skin is not jaundiced or pale. Findings: No erythema or rash. Neurological:      Mental Status: She is alert and oriented to person, place, and time. Mental status is at baseline. Psychiatric:         Mood and Affect: Mood normal.         Behavior: Behavior normal.         Thought Content: Thought content normal.         Judgment: Judgment normal.     /76 (Site: Left Upper Arm, Position: Sitting, Cuff Size: Medium Adult)   Pulse 65   Temp 97.9 °F (36.6 °C)   Ht 5' 6\" (1.676 m)   Wt 172 lb (78 kg)   SpO2 98%   BMI 27.76 kg/m²     Assessment:       Diagnosis Orders   1. Exposure to STD              Plan:   Valtrex take 1 tablet 3 times a day for 7 days    Make sure to use barrier method protection for sexual intercourse. Avoid feminine hygiene products or douches. RePHresh vaginal Ph correcting lubricant     Return if any symptoms of STI occur such as genital sores, vaginal discharge, smelly odor or itching. Continue Flagyl as previously prescribed until complete. Patient given educational materials -see patient instructions. Discussed use, benefit, and side effects of prescribed medications. All patient questions answered. Pt voiced understanding. Reviewed health maintenance. Instructed to continue currentmedications, diet and exercise. Patient agreed with treatment plan. Follow up as directed.    MEDICATIONS:  Orders Placed This Encounter   Medications    valACYclovir (VALTREX) 1 g tablet     Sig: Take 1 tablet by mouth 3 times daily for 7 days     Dispense:  21 tablet     Refill:  0         ORDERS:  No orders of the defined types were placed in this encounter. Follow-up:  Return if symptoms worsen or fail to improve. PATIENT INSTRUCTIONS:  There are no Patient Instructions on file for this visit. Electronically signed by REGINE Caruso NP on 10/3/2022 at 10:48 AM    EMR Dragon/transcription disclaimer:  Much of thisencounter note is electronic transcription/translation of spoken language to printed texts. The electronic translation of spoken language may be erroneous, or at times, nonsensical words or phrases may be inadvertentlytranscribed.   Although I have reviewed the note for such errors, some may still exist.

## 2023-01-09 ENCOUNTER — OFFICE VISIT (OUTPATIENT)
Dept: PRIMARY CARE CLINIC | Age: 23
End: 2023-01-09
Payer: COMMERCIAL

## 2023-01-09 VITALS
OXYGEN SATURATION: 100 % | HEIGHT: 66 IN | HEART RATE: 74 BPM | SYSTOLIC BLOOD PRESSURE: 120 MMHG | WEIGHT: 165 LBS | BODY MASS INDEX: 26.52 KG/M2 | TEMPERATURE: 97.6 F | DIASTOLIC BLOOD PRESSURE: 74 MMHG

## 2023-01-09 DIAGNOSIS — N89.8 VAGINAL DISCHARGE: ICD-10-CM

## 2023-01-09 DIAGNOSIS — Z72.51 UNPROTECTED SEX: ICD-10-CM

## 2023-01-09 DIAGNOSIS — B00.9 HERPES SIMPLEX VIRUS INFECTION: ICD-10-CM

## 2023-01-09 DIAGNOSIS — R30.0 DYSURIA: Primary | ICD-10-CM

## 2023-01-09 LAB
APPEARANCE FLUID: ABNORMAL
BILIRUBIN, POC: ABNORMAL
BLOOD URINE, POC: ABNORMAL
CLARITY, POC: ABNORMAL
COLOR, POC: ABNORMAL
GLUCOSE URINE, POC: ABNORMAL
KETONES, POC: ABNORMAL
LEUKOCYTE EST, POC: ABNORMAL
NITRITE, POC: ABNORMAL
PH, POC: ABNORMAL
PROTEIN, POC: ABNORMAL
SPECIFIC GRAVITY, POC: ABNORMAL
UROBILINOGEN, POC: ABNORMAL

## 2023-01-09 PROCEDURE — 81002 URINALYSIS NONAUTO W/O SCOPE: CPT | Performed by: NURSE PRACTITIONER

## 2023-01-09 PROCEDURE — 99214 OFFICE O/P EST MOD 30 MIN: CPT | Performed by: NURSE PRACTITIONER

## 2023-01-09 RX ORDER — VALACYCLOVIR HYDROCHLORIDE 1 G/1
1000 TABLET, FILM COATED ORAL 3 TIMES DAILY
Qty: 21 TABLET | Refills: 0 | Status: SHIPPED | OUTPATIENT
Start: 2023-01-09 | End: 2023-01-16

## 2023-01-09 RX ORDER — FLUCONAZOLE 150 MG/1
150 TABLET ORAL
Qty: 2 TABLET | Refills: 0 | Status: SHIPPED | OUTPATIENT
Start: 2023-01-09 | End: 2023-01-15

## 2023-01-09 ASSESSMENT — PATIENT HEALTH QUESTIONNAIRE - PHQ9
SUM OF ALL RESPONSES TO PHQ QUESTIONS 1-9: 0
SUM OF ALL RESPONSES TO PHQ9 QUESTIONS 1 & 2: 0
SUM OF ALL RESPONSES TO PHQ QUESTIONS 1-9: 0
3. TROUBLE FALLING OR STAYING ASLEEP: 0
10. IF YOU CHECKED OFF ANY PROBLEMS, HOW DIFFICULT HAVE THESE PROBLEMS MADE IT FOR YOU TO DO YOUR WORK, TAKE CARE OF THINGS AT HOME, OR GET ALONG WITH OTHER PEOPLE: 0
9. THOUGHTS THAT YOU WOULD BE BETTER OFF DEAD, OR OF HURTING YOURSELF: 0
SUM OF ALL RESPONSES TO PHQ QUESTIONS 1-9: 0
4. FEELING TIRED OR HAVING LITTLE ENERGY: 0
SUM OF ALL RESPONSES TO PHQ9 QUESTIONS 1 & 2: 0
SUM OF ALL RESPONSES TO PHQ QUESTIONS 1-9: 0
2. FEELING DOWN, DEPRESSED OR HOPELESS: 0
SUM OF ALL RESPONSES TO PHQ QUESTIONS 1-9: 0
2. FEELING DOWN, DEPRESSED OR HOPELESS: 0
1. LITTLE INTEREST OR PLEASURE IN DOING THINGS: 0
5. POOR APPETITE OR OVEREATING: 0
6. FEELING BAD ABOUT YOURSELF - OR THAT YOU ARE A FAILURE OR HAVE LET YOURSELF OR YOUR FAMILY DOWN: 0
8. MOVING OR SPEAKING SO SLOWLY THAT OTHER PEOPLE COULD HAVE NOTICED. OR THE OPPOSITE, BEING SO FIGETY OR RESTLESS THAT YOU HAVE BEEN MOVING AROUND A LOT MORE THAN USUAL: 0
SUM OF ALL RESPONSES TO PHQ QUESTIONS 1-9: 0
7. TROUBLE CONCENTRATING ON THINGS, SUCH AS READING THE NEWSPAPER OR WATCHING TELEVISION: 0
1. LITTLE INTEREST OR PLEASURE IN DOING THINGS: 0

## 2023-01-09 ASSESSMENT — ENCOUNTER SYMPTOMS
RESPIRATORY NEGATIVE: 1
ALLERGIC/IMMUNOLOGIC NEGATIVE: 1
COUGH: 0
SHORTNESS OF BREATH: 0
GASTROINTESTINAL NEGATIVE: 1
EYES NEGATIVE: 1

## 2023-01-09 NOTE — PROGRESS NOTES
[address] [facility]  [address]  [address] [address]  Dept: [phone]  Dept Fax: [phone]  Loc: [phone]    Clarence Hancock is a 25 y.o. female who presents today for her medical conditions/complaints as noted below. Clarence Hancock is c/o of Skin Problem (\"Vaginal outbreak\" - Yesterday. Patient states started with UTI and had unprotected sex and patient noticed an outbreak of \"herpes\" afterwards. Patient states this is a chronic, ongoing problem and would like to begin Valtrex treatment for daily coverage.  )        HPI:     HPI       Chief Complaint   Patient presents with    Skin Problem     \"Vaginal outbreak\" - Yesterday. Patient states started with UTI and had unprotected sex and patient noticed an outbreak of \"herpes\" afterwards. Patient states this is a chronic, ongoing problem and would like to begin Valtrex treatment for daily coverage.        Past Medical History:   Diagnosis Date    Allergy     Environmental allergies       Past Surgical History:   Procedure Laterality Date    ADENOIDECTOMY  07-        TONSILLECTOMY  07-    Dr Alonso Gaston  11/2020    [facility]- Tympanic membrane patch    TYMPANOSTOMY TUBE PLACEMENT         Vitals 1/9/2023 10/3/2022 9/22/2022 8/30/2022 6/28/2022 4/91/5075   SYSTOLIC 449 701 484 020 358 -   DIASTOLIC 74 76 65 60 62 -   Site - Left Upper Arm - - - -   Position - Sitting - - - -   Cuff Size - Medium Adult - - - -   Pulse 74 65 67 82 74 -   Temp 97.6 97.9 98 97.5 97.2 -   Resp - - 18 18 - -   SpO2 100 98 100 98 99 -   Weight 165 lb 172 lb 178 lb 9.6 oz 176 lb 9.6 oz - -   Height 5' 6\" 5' 6\" 5' 6\" 5' 6\" - -   Body mass index 26.63 kg/m2 27.76 kg/m2 28.82 kg/m2 28.5 kg/m2 - -   Pain Level - - - - - 7   Some recent data might be hidden       Family History   Problem Relation Age of Onset    Hypertension Mother        Social History     Tobacco Use    Smoking status: Never    Smokeless tobacco: Never   Substance Use Topics    Alcohol use: Not Currently     Comment: Occ      No current outpatient medications on file prior to visit. No current facility-administered medications on file prior to visit. No Known Allergies    Health Maintenance   Topic Date Due    COVID-19 Vaccine (1) Never done    Flu vaccine (1) 08/01/2022    Chlamydia/GC screen  06/16/2023    Depression Monitoring  06/27/2023    Pap smear  05/31/2025    DTaP/Tdap/Td vaccine (5 - Td or Tdap) 07/31/2027    Hib vaccine  Completed    HPV vaccine  Completed    Varicella vaccine  Completed    Meningococcal (ACWY) vaccine  Completed    Hepatitis C screen  Completed    HIV screen  Completed    Hepatitis A vaccine  Aged Out    Pneumococcal 0-64 years Vaccine  Aged Out       Subjective:      Review of Systems   Constitutional: Negative. Negative for chills, fatigue and fever. HENT: Negative. Eyes: Negative. Respiratory: Negative. Negative for cough and shortness of breath. Cardiovascular: Negative. Negative for chest pain. Gastrointestinal: Negative. Endocrine: Negative. Genitourinary:  Positive for dysuria, genital sores and vaginal discharge. Negative for decreased urine volume, difficulty urinating, dyspareunia, menstrual problem, vaginal bleeding and vaginal pain. Musculoskeletal: Negative. Skin: Negative. Allergic/Immunologic: Negative. Neurological: Negative. Hematological: Negative. Psychiatric/Behavioral: Negative. Negative for dysphoric mood, self-injury and suicidal ideas. The patient is not nervous/anxious. Objective:     Physical Exam  Vitals and nursing note reviewed. Constitutional:       General: She is not in acute distress. Appearance: Normal appearance. She is not ill-appearing or toxic-appearing. HENT:      Head: Normocephalic and atraumatic.       Nose: Nose normal.      Mouth/Throat:      Mouth: Mucous membranes are moist.   Eyes:      Pupils: Pupils are equal, round, and reactive to light. Cardiovascular:      Rate and Rhythm: Normal rate and regular rhythm. Pulses: Normal pulses. Heart sounds: Normal heart sounds. Pulmonary:      Effort: Pulmonary effort is normal. No respiratory distress. Breath sounds: Normal breath sounds. Abdominal:      General: Bowel sounds are normal.      Palpations: Abdomen is soft. Genitourinary:     General: Normal vulva. Exam position: Lithotomy position. Pubic Area: No rash or pubic lice. Lizandro stage (genital): 5. Labia:         Right: No rash, tenderness, lesion or injury. Left: No rash, tenderness, lesion or injury. Vagina: Vaginal discharge present. No tenderness, bleeding or prolapsed vaginal walls. Cervix: Normal.      Uterus: Normal.       Adnexa: Right adnexa normal and left adnexa normal.      Rectum: Normal.   Musculoskeletal:         General: Normal range of motion. Cervical back: Normal range of motion. Skin:     General: Skin is warm and dry. Neurological:      Mental Status: She is alert and oriented to person, place, and time. Mental status is at baseline. Psychiatric:         Mood and Affect: Mood normal.         Behavior: Behavior normal.     /74   Pulse 74   Temp 97.6 °F (36.4 °C)   Ht 5' 6\" (1.676 m)   Wt 165 lb (74.8 kg)   LMP 12/15/2022   SpO2 100%   BMI 26.63 kg/m²     Assessment:       Diagnosis Orders   1. Dysuria  POCT Urinalysis no Micro    Culture, Urine      2. Vaginal discharge  Nuswab Vaginitis Plus (VG+)      3. Unprotected sex  Nuswab Vaginitis Plus (VG+)      4. Herpes simplex virus infection              Plan:   1.-3. Pelvic exam today no abnormalities noted other than discharge. Patient states that she uses supplements and over-the-counter remedies to help with symptoms    New swab vaginitis plus swab obtained today to check for STDs.     4.  States that recently she started noticing the open lesion after her Quartuss that she applies colloidal silver 500 ppm and black seed oil topically and takes this orally. She states that the external symptoms resolved with that. Valtrex 1 g take 3 times a day for 7 days as needed for herpes outbreak  Diflucan take 1 tablet now and take 1 tablet in 7 days if symptoms persist.    Patient states that she had convinced herself that she had cured the genital herpes. She states that since she had not had an outbreak in 2 years she felt sure this. However this recent outbreak brought to her attention that that might not be correct. We did have a long discussion about herpes being a lifelong condition. We did discuss appropriate information to give to sexual partners. Use of a barrier method such as a condom is preferred method to decrease transmission. Patient was instructed that the first sign of inguinal hernia tenderness or swelling or symptoms she is to start the Valtrex. Patient given educational materials -see patient instructions. Discussed use, benefit, and side effects of prescribed medications. All patient questions answered. Pt voiced understanding. Reviewed health maintenance. Instructed to continue currentmedications, diet and exercise. Patient agreed with treatment plan. Follow up as directed. MEDICATIONS:  Orders Placed This Encounter   Medications    valACYclovir (VALTREX) 1 g tablet     Sig: Take 1 tablet by mouth 3 times daily for 7 days     Dispense:  21 tablet     Refill:  0    fluconazole (DIFLUCAN) 150 MG tablet     Sig: Take 1 tablet by mouth every 72 hours for 6 days     Dispense:  2 tablet     Refill:  0         ORDERS:  Orders Placed This Encounter   Procedures    Culture, Urine    Nuswab Vaginitis Plus (VG+)    POCT Urinalysis no Micro       Follow-up:  Return if symptoms worsen or fail to improve.     PATIENT INSTRUCTIONS:  Patient Instructions     Electronically signed by REGINE Chavez NP on 1/9/2023 at 10:04 AM    EMR Dragon/transcription disclaimer:  Much of thisencounter note is electronic transcription/translation of spoken language to printed texts. The electronic translation of spoken language may be erroneous, or at times, nonsensical words or phrases may be inadvertentlytranscribed.   Although I have reviewed the note for such errors, some may still exist.

## 2023-01-11 ENCOUNTER — TELEPHONE (OUTPATIENT)
Dept: PRIMARY CARE CLINIC | Age: 23
End: 2023-01-11

## 2023-01-11 ENCOUNTER — NURSE ONLY (OUTPATIENT)
Dept: PRIMARY CARE CLINIC | Age: 23
End: 2023-01-11

## 2023-01-11 DIAGNOSIS — A54.9 GONORRHEA: Primary | ICD-10-CM

## 2023-01-11 LAB
ATOPOBIUM VAGINAE: ABNORMAL SCORE
C TRACH DNA CVX QL NAA+PROBE: NEGATIVE
CANDIDA ALBICANS, NAA: NEGATIVE
CANDIDA GLABRATA: NEGATIVE
MEGASHAERA: ABNORMAL SCORE
NEISSERIA GONORRHOEAE, DNA: POSITIVE
URINE CULTURE, ROUTINE: NORMAL
VAGINAL PATHOGENS DNA PANEL: NEGATIVE
VAGINOSIS/VAGINITIS, DNA PROBE: ABNORMAL SCORE

## 2023-01-11 RX ORDER — AZITHROMYCIN 500 MG/1
1000 TABLET, FILM COATED ORAL ONCE
Qty: 2 TABLET | Refills: 0 | Status: SHIPPED | OUTPATIENT
Start: 2023-01-11 | End: 2023-01-11

## 2023-01-11 NOTE — TELEPHONE ENCOUNTER
Patient called again to check on the medication being sent to her pharmacy. Patient wanted to get the medicine before she went to work today and suggested getting an injection. I spoke to her provider and she said it was okay for the patient to come in for a Rocephin injection. I verified results with the patient and informed her to come in today at 1 PM  for the Rocephin injection. Patient voiced understanding.

## 2023-01-11 NOTE — TELEPHONE ENCOUNTER
Pt came in today for injection of rocephin.  She is also requesting oral antibiotics sent to the pharmacy

## 2023-01-11 NOTE — TELEPHONE ENCOUNTER
Pt calling for results that PJ ordered. Actually she walked in and very anxious about wanting med called in for Positive results.  G&O

## 2023-01-12 DIAGNOSIS — A54.9 GONORRHEA: Primary | ICD-10-CM

## 2023-01-12 RX ORDER — AZITHROMYCIN 500 MG/1
2000 TABLET, FILM COATED ORAL ONCE
Qty: 4 TABLET | Refills: 0 | Status: SHIPPED | OUTPATIENT
Start: 2023-01-12 | End: 2023-01-12

## 2023-01-23 ENCOUNTER — OFFICE VISIT (OUTPATIENT)
Dept: OTOLARYNGOLOGY | Facility: CLINIC | Age: 23
End: 2023-01-23
Payer: COMMERCIAL

## 2023-01-23 ENCOUNTER — NURSE ONLY (OUTPATIENT)
Dept: PRIMARY CARE CLINIC | Age: 23
End: 2023-01-23
Payer: COMMERCIAL

## 2023-01-23 VITALS
DIASTOLIC BLOOD PRESSURE: 82 MMHG | WEIGHT: 165 LBS | HEART RATE: 70 BPM | HEIGHT: 66 IN | BODY MASS INDEX: 26.52 KG/M2 | SYSTOLIC BLOOD PRESSURE: 131 MMHG | TEMPERATURE: 98.2 F

## 2023-01-23 DIAGNOSIS — R30.0 DYSURIA: Primary | ICD-10-CM

## 2023-01-23 DIAGNOSIS — H90.11 CONDUCTIVE HEARING LOSS OF RIGHT EAR WITH UNRESTRICTED HEARING OF LEFT EAR: ICD-10-CM

## 2023-01-23 DIAGNOSIS — Z72.51 UNPROTECTED SEX: ICD-10-CM

## 2023-01-23 DIAGNOSIS — H72.91 PERFORATED TYMPANIC MEMBRANE ON EXAMINATION, RIGHT: Primary | ICD-10-CM

## 2023-01-23 DIAGNOSIS — H92.11 OTORRHEA, RIGHT: ICD-10-CM

## 2023-01-23 DIAGNOSIS — A54.9 GONORRHEA: Primary | ICD-10-CM

## 2023-01-23 LAB
APPEARANCE FLUID: NORMAL
BILIRUBIN, POC: NORMAL
BLOOD URINE, POC: NORMAL
CLARITY, POC: NORMAL
COLOR, POC: YELLOW
GLUCOSE URINE, POC: NORMAL
KETONES, POC: NORMAL
LEUKOCYTE EST, POC: NORMAL
NITRITE, POC: NORMAL
PH, POC: 6
PROTEIN, POC: NORMAL
SPECIFIC GRAVITY, POC: 1.03
UROBILINOGEN, POC: NORMAL

## 2023-01-23 PROCEDURE — 81002 URINALYSIS NONAUTO W/O SCOPE: CPT | Performed by: FAMILY MEDICINE

## 2023-01-23 PROCEDURE — 99213 OFFICE O/P EST LOW 20 MIN: CPT | Performed by: OTOLARYNGOLOGY

## 2023-01-23 PROCEDURE — 92504 EAR MICROSCOPY EXAMINATION: CPT | Performed by: OTOLARYNGOLOGY

## 2023-01-23 NOTE — PROGRESS NOTES
Deandre Wilcox Jr, MD  [facility] EAR NOSE & THROAT  [address], [address]  [facility] [address]  Fax [phone]  Phone [phone]      Visit Type: FOLLOW UP   Chief Complaint   Patient presents with   • Ear Problem     Right tympanic membrane perforation          HPI   Accompanied by: No one  She presents for a follow up evaluation. She has had no ear issues.  Here for check.     History reviewed. No pertinent past medical history.    Past Surgical History:   Procedure Laterality Date   • EAR TUBES     • TONSILLECTOMY         Family History: Her family history is not on file.     Social History: She  reports that she has never smoked. She has never used smokeless tobacco. She reports that she does not drink alcohol and does not use drugs.    Home Medications:  busPIRone, clindamycin, escitalopram, fluconazole, fluticasone, mupirocin, naproxen, norgestimate-ethinyl estradiol, sulfamethoxazole-trimethoprim, and valACYclovir    Allergies:  She has No Known Allergies.       Vital Signs:   Temp:  [98.2 °F (36.8 °C)] 98.2 °F (36.8 °C)  Heart Rate:  [70] 70  BP: (131)/(82) 131/82  ENT Physical Exam  Drawings        Ear Microscopy    Date/Time: 1/23/2023 2:54 PM  Performed by: Deandre Wilcox Jr., MD  Authorized by: Deandre Wilcox Jr., MD     Ear examination was performed utilizing binocular microscopy.  Right auricle:   normal:   Right ear canal:   normal:   Right tympanic membrane:   perforated. not erythematous. perforation details: anterior central perforation Anterior to malleus, 15% perforation with monomer trying to close from posterior and inferior.   Right mastoid:   normal mastoid:     Post-procedure details:     The procedure was tolerated well, no immediate complications.       Result Review    RESULTS REVIEW    I have reviewed the patients old records in the chart.   I have reviewed the patients old records in the chart.      Assessment & Plan    Diagnoses and all orders for this visit:    1. Perforated tympanic membrane on examination, right (Primary)  Comments:  Central perforation, dry  Orders:  -     Ear Microscopy  -     Comprehensive Hearing Test; Future  -     Comprehensive Hearing Test; Future    2. Conductive hearing loss of right ear with unrestricted hearing of left ear  Comments:  Related to perforation  Orders:  -     Ear Microscopy  -     Comprehensive Hearing Test; Future  -     Comprehensive Hearing Test; Future    3. Otorrhea, right  Comments:  Resolved  Orders:  -     Ear Microscopy       Medical and surgical options were discussed including observation, continued medical management, medication modification and surgical management. Risks, benefits and alternatives were discussed and questions were answered. After considering the options, the patient decided to proceed with surgical management.  Patient continues perforation.  She has a monomeric segment trying to close but appears too thin to complete the closure.  She wishes repair.  I discussed risk, benefits, alternative treatments, options.  She require audiogram.  Since she wishes closure, I will refer to Dr. Hill for endoscopic tympanoplasty.  She appears to be a good candidate for this.  Water precautions  Audiogram ordered  Refer Dr. Hill for endoscopic tympanoplasty    My Chart:  Patient is using My Chart    Patient understand(s) and agree(s) with the treatment plan as described.    Return in about 6 months (around 7/23/2023) for Recheck Ears, Audio.      Deandre Wilcox Jr, MD   01/23/23  14:35 CST

## 2023-01-23 NOTE — PATIENT INSTRUCTIONS
WATER PRECAUTIONS FOR EARS    Protecting your ears from water may sometimes be necessary for the health of your ears.     > Ear plugs: You may use earplugs: over the counter silicone plugs or a cotton ball coated with vasoline when bathing. If conservative measures are not working, consider obtaining molded earplugs from the audiology department to use while bathing or swimming.   Purchase inexpensive types that are most comfortable for you. You can make your own by using cotton balls mixed with a generous amount of Vaseline petroleum jelly. Gently place these in the ear canal.    > Dry the ear canal: after getting out of the shower or bath, use a hair dryer on low heat blowing in the ear for 10-15 seconds. Pulling gently backwards on the ear straightens the ear canal and allows the air to get further down.    > If you are to use ear drops, please place them in the ear canal and give them a few seconds to run down.  Follow this by blowing in the ear canal with a hair dryer set on low heat for approximately 10 to 15 seconds.  You may do this multiple times during the day to help keep the ear canal dry.    >If you are swimming frequently- place a drop of oil in each ear canal prior to entering water. After you are finished in the water, place a drop of vinegar in each ear canal. Use a hair dryer on low heat to blow in each ear canal for 10-15 seconds to dry ears out.     Call for ear drainage     CONTACT INFORMATION:  The main office phone number is [phone]. For emergencies after hours and on weekends, this number will convert over to our answering service and the on call provider will answer. Please try to keep non emergent phone calls/ questions to office hours 9am-5pm Monday through Friday.     [place]  As an alternative, you can sign up and use the Epic MyChart system for more direct and quicker access for non emergent questions/ problems.  [facility] [place] allows you to send messages to your doctor,  view your test results, renew your prescriptions, schedule appointments, and more. To sign up, go to [email or web address] and click on the Sign Up Now link in the New User? box. Enter your [employer] Activation Code exactly as it appears below along with the last four digits of your Social Security Number and your Date of Birth () to complete the sign-up process. If you do not sign up before the expiration date, you must request a new code.    [employer] Activation Code: Activation code not generated  Current [employer] Status: Active    If you have questions, you can email [email or web address] or call [phone] to talk to our [employer] staff. Remember, [employer] is NOT to be used for urgent needs. For medical emergencies, dial 911.

## 2023-01-27 ENCOUNTER — OFFICE VISIT (OUTPATIENT)
Dept: PRIMARY CARE CLINIC | Age: 23
End: 2023-01-27
Payer: COMMERCIAL

## 2023-01-27 VITALS
WEIGHT: 166 LBS | OXYGEN SATURATION: 99 % | SYSTOLIC BLOOD PRESSURE: 122 MMHG | DIASTOLIC BLOOD PRESSURE: 70 MMHG | HEIGHT: 66 IN | BODY MASS INDEX: 26.68 KG/M2 | TEMPERATURE: 97.5 F | HEART RATE: 88 BPM

## 2023-01-27 DIAGNOSIS — N94.6 DIFFICULT MENSTRUATION: Primary | ICD-10-CM

## 2023-01-27 PROCEDURE — 99212 OFFICE O/P EST SF 10 MIN: CPT | Performed by: NURSE PRACTITIONER

## 2023-01-27 ASSESSMENT — ENCOUNTER SYMPTOMS
WHEEZING: 0
ALLERGIC/IMMUNOLOGIC NEGATIVE: 1
EYES NEGATIVE: 1
NAUSEA: 0
ABDOMINAL PAIN: 0
VOMITING: 0
SHORTNESS OF BREATH: 0
GASTROINTESTINAL NEGATIVE: 1
RESPIRATORY NEGATIVE: 1
COUGH: 0

## 2023-01-27 ASSESSMENT — PATIENT HEALTH QUESTIONNAIRE - PHQ9
4. FEELING TIRED OR HAVING LITTLE ENERGY: 0
9. THOUGHTS THAT YOU WOULD BE BETTER OFF DEAD, OR OF HURTING YOURSELF: 0
6. FEELING BAD ABOUT YOURSELF - OR THAT YOU ARE A FAILURE OR HAVE LET YOURSELF OR YOUR FAMILY DOWN: 0
SUM OF ALL RESPONSES TO PHQ QUESTIONS 1-9: 7
SUM OF ALL RESPONSES TO PHQ9 QUESTIONS 1 & 2: 0
7. TROUBLE CONCENTRATING ON THINGS, SUCH AS READING THE NEWSPAPER OR WATCHING TELEVISION: 0
8. MOVING OR SPEAKING SO SLOWLY THAT OTHER PEOPLE COULD HAVE NOTICED. OR THE OPPOSITE, BEING SO FIGETY OR RESTLESS THAT YOU HAVE BEEN MOVING AROUND A LOT MORE THAN USUAL: 2
10. IF YOU CHECKED OFF ANY PROBLEMS, HOW DIFFICULT HAVE THESE PROBLEMS MADE IT FOR YOU TO DO YOUR WORK, TAKE CARE OF THINGS AT HOME, OR GET ALONG WITH OTHER PEOPLE: 0
3. TROUBLE FALLING OR STAYING ASLEEP: 2
2. FEELING DOWN, DEPRESSED OR HOPELESS: 0
SUM OF ALL RESPONSES TO PHQ QUESTIONS 1-9: 7
5. POOR APPETITE OR OVEREATING: 3
1. LITTLE INTEREST OR PLEASURE IN DOING THINGS: 0

## 2023-01-27 NOTE — PROGRESS NOTES
[address] [facility]  [address] [place] [address]  [address] [place] [address]  Dept: [phone]  Dept Fax: [phone]  Loc: [phone]    Kenyon Muller is a 25 y.o. female who presents today for her medical conditions/complaints as noted below. Kenyon Muller is c/o of Follow-up (2 week) and Discuss Labs        HPI:     HPI this 59-year-old female presents today for follow-up on her recent infection and would like to discuss her labs. She does state that her last menstrual cycle was much more severe than normal.  She states that her normal very very regular and with very few symptoms. She states this last cycle she had nausea vomiting and quite a bit of clotting.   Chief Complaint   Patient presents with    Follow-up     2 week    Discuss Labs     Past Medical History:   Diagnosis Date    Allergy     Environmental allergies       Past Surgical History:   Procedure Laterality Date    ADENOIDECTOMY  07-        TONSILLECTOMY  07-    Dr Dom Banda  11/2020    [facility]- Tympanic membrane patch    TYMPANOSTOMY TUBE PLACEMENT         Vitals 1/27/2023 1/9/2023 10/3/2022 9/22/2022 8/30/2022 4/23/7368   SYSTOLIC 698 811 002 192 148 823   DIASTOLIC 70 74 76 65 60 62   Site - - Left Upper Arm - - -   Position - - Sitting - - -   Cuff Size - - Medium Adult - - -   Pulse 88 74 65 67 82 74   Temp 97.5 97.6 97.9 98 97.5 97.2   Resp - - - 18 18 -   SpO2 99 100 98 100 98 99   Weight 166 lb 165 lb 172 lb 178 lb 9.6 oz 176 lb 9.6 oz -   Height 5' 6\" 5' 6\" 5' 6\" 5' 6\" 5' 6\" -   Body mass index 26.79 kg/m2 26.63 kg/m2 27.76 kg/m2 28.82 kg/m2 28.5 kg/m2 -   Pain Level - - - - - -   Some recent data might be hidden       Family History   Problem Relation Age of Onset    Hypertension Mother        Social History     Tobacco Use    Smoking status: Never    Smokeless tobacco: Never   Substance Use Topics    Alcohol use: Not Currently     Comment: Occ      No current outpatient medications Patient will sign release to have lab results sent to primary physician.    What Is Peripheral Neuropathy?    Peripheral neuropathy is a disease of the nerves in your feet. It may make you unable to sense pain. Lack of pain makes you more likely to injure yourself without knowing it. But you can learn ways to protect your feet from injury.  When Nerves Are Diseased  Nerves in your feet carry signals to your brain. Your brain reads those signals and interprets them as sensations. When nerves in your feet are diseased, signals may never reach the brain. Or, signals may be confused. The result may be a lack of feeling in your feet or other symptoms of peripheral neuropathy.    Symptoms Mask Pain  Symptoms of peripheral neuropathy begin in your toes. The symptoms slowly spread up your feet and legs as more nerve is affected. These symptoms may decrease sensation in your feet or mask pain. Without pain, you may not notice a cut or even a bone fracture. Cuts may become infected. Fractures may heal poorly and lead to foot deformity.    © 9616-5993 [facility], [address] [place] [address], [place] [address]. All rights reserved. This information is not intended as a substitute for professional medical care. Always follow your healthcare professional's instructions.    Treating Peripheral Neuropathy  Learn ways to protect your feet. Check your feet daily for wounds you may not have felt. Avoid burns by testing bath water with your elbow before stepping in. Also, to prevent injury, always wear shoes.    Regular Foot Care  If you have foot numbness, you may not notice cutting yourself while trimming your nails. To prevent problems, your doctor may ask you to visit for nail and callus trimming. See your doctor for foot care as often as suggested.  Check Your Feet Daily  Catch problems early by checking your feet every day for changes. Look at the top and bottom of your feet, your heels, and between your toes. It may help to use  a mirror. If this is hard, ask someone to check for you. Call your doctor if you notice a wound, ulceration, ingrown nail, or any changes in your feet. This includes increased heat, swelling, and redness.  Wear Proper Footwear  Always wear shoes and socks, even indoors. Ask your doctor how to choose the right shoe. After buying shoes, bring them to your doctor to be checked for fit. Take new shoes off every hour or so to check for red pressure areas on your feet. Each time you put on your shoes, use your fingers first to feel inside for foreign objects.  © 9888-8628 [facility], [address] [place] [address], [place] [address]. All rights reserved. This information is not intended as a substitute for professional medical care. Always follow your healthcare professional's instructions.      PERIPHERAL NEUROPATHY  Peripheral Neuropathy is a condition that affects the nerves of the arms or legs. It causes a change in physical feeling. Sometimes it causes weakness in the muscles. You may feel tingling, numbness or shooting pains (especially at night). You may be sensitive to light touch or temperature changes.  Neuropathy may be caused by being exposed to certain drugs or chemicals, or because of a vitamin deficiency. It can be a complication of a chronic disease such as diabetes or alcoholism. A ruptured disk with pressure on the spinal nerve may also cause this condition.  HOME CARE:  1) You may take acetaminophen (Tylenol) or ibuprofen (Advil, Motrin) for pain, unless another pain medicine has been prescribed.  2) If the neuropathy affects your feet, keep your toenails trimmed and wash your feet often. Wear shoes that fit well. Doing so avoids pressure points, blisters and ulcers. Due to a loss of feeling, you may not notice injuries, so look at your feet carefully (including the soles of your feet and between your toes) at least once a week. Tell your doctor if you have any open wounds or signs of infection.  3) If  on file prior to visit. No current facility-administered medications on file prior to visit. No Known Allergies    Health Maintenance   Topic Date Due    COVID-19 Vaccine (1) Never done    Flu vaccine (1) 08/01/2022    Depression Monitoring  01/09/2024    Chlamydia/GC screen  01/23/2024    Pap smear  05/31/2025    DTaP/Tdap/Td vaccine (5 - Td or Tdap) 07/31/2027    Hib vaccine  Completed    HPV vaccine  Completed    Varicella vaccine  Completed    Meningococcal (ACWY) vaccine  Completed    Hepatitis C screen  Completed    HIV screen  Completed    Hepatitis A vaccine  Aged Out    Pneumococcal 0-64 years Vaccine  Aged Out       Subjective:      Review of Systems   Constitutional: Negative. Negative for chills, fatigue and fever. HENT: Negative. Eyes: Negative. Respiratory: Negative. Negative for cough, shortness of breath and wheezing. Cardiovascular: Negative. Negative for chest pain and palpitations. Gastrointestinal: Negative. Negative for abdominal pain, nausea and vomiting. Endocrine: Negative. Genitourinary:  Positive for menstrual problem. Negative for decreased urine volume, vaginal bleeding, vaginal discharge and vaginal pain. Musculoskeletal: Negative. Skin: Negative. Allergic/Immunologic: Negative. Neurological: Negative. Negative for headaches. Hematological: Negative. Psychiatric/Behavioral: Negative. Negative for self-injury and suicidal ideas. The patient is not nervous/anxious. Objective:     Physical Exam  Vitals and nursing note reviewed. Constitutional:       General: She is not in acute distress. Appearance: Normal appearance. She is not ill-appearing or toxic-appearing. HENT:      Head: Normocephalic and atraumatic. Nose: Nose normal.      Mouth/Throat:      Mouth: Mucous membranes are moist.   Eyes:      Pupils: Pupils are equal, round, and reactive to light. Cardiovascular:      Rate and Rhythm: Normal rate and regular rhythm. Pulses: Normal pulses. Heart sounds: Normal heart sounds. No murmur heard. Pulmonary:      Effort: Pulmonary effort is normal. No respiratory distress. Breath sounds: Normal breath sounds. No wheezing, rhonchi or rales. Abdominal:      General: Bowel sounds are normal. There is no distension. Palpations: Abdomen is soft. Tenderness: There is no abdominal tenderness. Musculoskeletal:         General: Normal range of motion. Cervical back: Normal range of motion. Skin:     General: Skin is warm and dry. Coloration: Skin is not jaundiced or pale. Findings: No erythema or rash. Neurological:      Mental Status: She is alert and oriented to person, place, and time. Mental status is at baseline. Psychiatric:         Mood and Affect: Mood normal.         Behavior: Behavior normal.         Thought Content: Thought content normal.         Judgment: Judgment normal.     /70   Pulse 88   Temp 97.5 °F (36.4 °C)   Ht 5' 6\" (1.676 m)   Wt 166 lb (75.3 kg)   LMP 01/16/2023   SpO2 99%   BMI 26.79 kg/m²     Assessment:       Diagnosis Orders   1. Difficult menstruation              Plan:   Patient here to discuss her recent lab test.  She has had 2 recent chlamydial infections. She does report this last menstrual cycle was more symptomatic than her norm. She states that she did have some nausea vomiting and did appear to have more bruising with some clotting. Patient was on antibiotic to treat chlamydia. Repeat test indicated clearance. Patient was instructed we will monitor the next cycle. She states that she takes ibuprofen for menstrual cramps. She was encouraged to start that medication the day before she starts to decrease inflammation. She is also encouraged to use safe sex practices such as using barrier contraceptive such as condoms. This will help to reduce the risk of STIs.     Patient states that she did notify her partner so that he could vitamins have been prescribed, be sure to remind yourself to take them daily.  FOLLOW UP with your doctor or as advised by our staff. You may need further testing to find out the exact cause of your neuropathy.  GET PROMPT MEDICAL ATTENTION if any of the following occur:  -- Redness, swelling or pus coming from the toes or feet  -- Loss of bowel or bladder control (if this is a new symptom for you)  -- Muscle weakness (if this is a new symptom for you)  © 20005661-2245 [employer] [facility], [address] [place] [address], [place] [address]. All rights reserved. This information is not intended as a substitute for professional medical care. Always follow your healthcare professional's instructions.      VITAMINS and SUPPLEMENTS FOR PERIPHERAL NEUROPATHY    1.  Multivitamin one daily (Centrum, Nature Made, Vitafusion or other good vitamins)-you can ask your pharmacist for recommendations also regarding a brand.    2. B complex one daily (just a regular, do not need super strength), no particular brand to recommend.  B vitamins are important for nerve health and function.    3. Alpha lipoic acid 600 to 1800 mg per day in two to three divided doses may help some patients with neuropathy.  This could mean taking 200 mg to 600 mg three times daily or 300 mg to 900 mg twice daily.  This supplement is a potent anti-oxidant, anti-inflammatory and chelating agent per research studies.    4. Evening primprose, 360 to 480 mg per day has been shown to help some patients with neuropathy.  This contains the omega 6 essential fatty acids: gamma linoleic acid (GLA) and linoleic acid; essential components of myelin and the nerve body cell membrane.  This may have mild side effects of inhibiting platelet aggregation (thinning the blood).  People taking aspirin, Plavix, Coumadin or any other blood thinning medication should check with their doctor prior to taking.  Patients with seizure disorder should not take unless directed by their doctor.    5.  Vitamin E 600 mg daily given to patients on chemotherapy and for 3 months after chemotherapy may help protect the nerves from the damaging effects of chemotherapy.         also be treated however she has stopped any interaction with him due to the 2 episodes of chlamydia. Patient given educational materials -see patient instructions. Discussed use, benefit, and side effects of prescribed medications. All patient questions answered. Pt voiced understanding. Reviewed health maintenance. Instructed to continue currentmedications, diet and exercise. Patient agreed with treatment plan. Follow up as directed. MEDICATIONS:  No orders of the defined types were placed in this encounter. ORDERS:  No orders of the defined types were placed in this encounter. Follow-up:  Return for Keep scheduled appointment for yearly physical and labs with Dr. Say Salas.    PATIENT INSTRUCTIONS:  There are no Patient Instructions on file for this visit. Electronically signed by REGINE Hartman NP on 1/27/2023 at 9:08 AM    EMR Dragon/transcription disclaimer:  Much of thisencounter note is electronic transcription/translation of spoken language to printed texts. The electronic translation of spoken language may be erroneous, or at times, nonsensical words or phrases may be inadvertentlytranscribed.   Although I have reviewed the note for such errors, some may still exist.

## 2023-02-06 NOTE — PROGRESS NOTES
AUDIOMETRIC EVALUATION      Name:  Susie Mon  :  2000  Age:  23 y.o.  Date of Evaluation:  2023       History:  Reason for visit:  Ms. Mon is seen today at the request of Deandre Wilcox Jr., MD for a hearing evaluation. Patient has a history of conductive hearing loss of right ear and tympanic membrane perforation, right. Patient had in office paper myringoplasty, right on 2020. Previous audio was on 2020. Patient feels she still has some hearing loss in the right ear, but not much compared to the left.     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  Aural Fullness:  no, both ears  Otalgia: yes, right ear, when yawning and going up and down hills  Family history of hearing loss: no  Other significant history: none  Head trauma requiring hospital stay: no  Previous brain MRI: possibly, years ago      EVALUATION:            RESULTS:    Otoscopic Evaluation:  Right: clear canal, tympanic membrane visualized and perforation visualized  Left: clear canal, tympanic membrane visualized     Tympanometry (226 Hz):  Right: Type B- large ear canal volume  Left: Type A- normal    Pure Tone Audiometry (via inserts with good reliability):    Right: mild conductive hearing loss rising to normal at .75kHz through 8kHz  Left: hearing sensitivity is within normal limits    Speech Audiometry:   Right: Speech Reception Threshold (SRT) was obtained at 15 dBHL  Word Recognition scores- excellent/within normal limits (90 - 100%) using NU-6 List 3A, 25 words  Left: Speech Reception Threshold (SRT) was obtained at 10 dBHL  Word Recognition scores- excellent/within normal limits (90 - 100%) using NU-6 List 3A, 25 words    IMPRESSIONS:  Tympanometry showed normal middle ear pressure and static compliance, for the left ear. Tympanometry  showed a large ear canal volume, consistent with a tympanic membrane perforation or a patent PE tube, for the right ear. Pure tone thresholds for the right ear show a mild low frequency conductive hearing loss, suggesting abnormal outer/middle ear function and normal cochlear/retrocochlear function. Pure tone thresholds for the left ear show hearing sensitivity is within normal limits, suggesting normal outer/middle ear function and normal cochlear/retrocochlear function. Significant improvement in right ear air pure tones compared to previous audio prior to paper myringoplasty. Patient was counseled with regard to the findings.      Diagnosis:  1. Conductive hearing loss of right ear with unrestricted hearing of left ear    2. Perforation of right tympanic membrane    3. Right ear pain         RECOMMENDATIONS/PLAN:  Follow-up recommendations per Deandre Wilcox Jr., MD    Audiologic follow-up in 6 months  Return for audiologic testing if noticing changes in hearing or concerns arise  Use hearing protection around loud noises     EDUCATION:  Discussed results and recommendations with patient. Questions were addressed and the patient was encouraged to contact our department should concerns arise.        Trista Cazares [facility]  Licensed Audiologist

## 2023-02-07 ENCOUNTER — PROCEDURE VISIT (OUTPATIENT)
Dept: OTOLARYNGOLOGY | Facility: CLINIC | Age: 23
End: 2023-02-07
Payer: COMMERCIAL

## 2023-02-07 DIAGNOSIS — H72.91 PERFORATION OF RIGHT TYMPANIC MEMBRANE: ICD-10-CM

## 2023-02-07 DIAGNOSIS — H90.11 CONDUCTIVE HEARING LOSS OF RIGHT EAR WITH UNRESTRICTED HEARING OF LEFT EAR: Primary | ICD-10-CM

## 2023-02-07 DIAGNOSIS — H92.01 RIGHT EAR PAIN: ICD-10-CM

## 2023-02-07 PROCEDURE — 92567 TYMPANOMETRY: CPT

## 2023-02-07 PROCEDURE — 92557 COMPREHENSIVE HEARING TEST: CPT

## 2023-02-28 ENCOUNTER — OFFICE VISIT (OUTPATIENT)
Dept: PRIMARY CARE CLINIC | Age: 23
End: 2023-02-28
Payer: COMMERCIAL

## 2023-02-28 VITALS
SYSTOLIC BLOOD PRESSURE: 114 MMHG | HEIGHT: 66 IN | HEART RATE: 60 BPM | BODY MASS INDEX: 27.32 KG/M2 | WEIGHT: 170 LBS | DIASTOLIC BLOOD PRESSURE: 76 MMHG | TEMPERATURE: 97.8 F | OXYGEN SATURATION: 99 %

## 2023-02-28 DIAGNOSIS — Z72.51 UNPROTECTED SEX: ICD-10-CM

## 2023-02-28 DIAGNOSIS — Z11.4 SCREENING FOR HIV (HUMAN IMMUNODEFICIENCY VIRUS): ICD-10-CM

## 2023-02-28 DIAGNOSIS — Z11.59 NEED FOR HEPATITIS C SCREENING TEST: ICD-10-CM

## 2023-02-28 DIAGNOSIS — Z20.2 EXPOSURE TO STD: ICD-10-CM

## 2023-02-28 DIAGNOSIS — Z70.9 SEX COUNSELING: Primary | ICD-10-CM

## 2023-02-28 LAB
ALBUMIN SERPL-MCNC: 4.7 G/DL (ref 3.5–5.2)
ALP BLD-CCNC: 60 U/L (ref 35–104)
ALT SERPL-CCNC: 10 U/L (ref 5–33)
ANION GAP SERPL CALCULATED.3IONS-SCNC: 12 MMOL/L (ref 7–19)
AST SERPL-CCNC: 18 U/L (ref 5–32)
BILIRUB SERPL-MCNC: 0.4 MG/DL (ref 0.2–1.2)
BUN BLDV-MCNC: 12 MG/DL (ref 6–20)
CALCIUM SERPL-MCNC: 9.2 MG/DL (ref 8.6–10)
CHLORIDE BLD-SCNC: 104 MMOL/L (ref 98–111)
CO2: 24 MMOL/L (ref 22–29)
CREAT SERPL-MCNC: 0.7 MG/DL (ref 0.5–0.9)
GFR SERPL CREATININE-BSD FRML MDRD: >60 ML/MIN/{1.73_M2}
GLUCOSE BLD-MCNC: 73 MG/DL (ref 74–109)
HCT VFR BLD CALC: 39.7 % (ref 37–47)
HEMOGLOBIN: 12.8 G/DL (ref 12–16)
HEPATITIS C ANTIBODY INTERPRETATION: NORMAL
HIV-1 P24 AG: NORMAL
MCH RBC QN AUTO: 28 PG (ref 27–31)
MCHC RBC AUTO-ENTMCNC: 32.2 G/DL (ref 33–37)
MCV RBC AUTO: 86.9 FL (ref 81–99)
PDW BLD-RTO: 11.7 % (ref 11.5–14.5)
PLATELET # BLD: 324 K/UL (ref 130–400)
PMV BLD AUTO: 9.5 FL (ref 9.4–12.3)
POTASSIUM SERPL-SCNC: 3.7 MMOL/L (ref 3.5–5)
RAPID HIV 1&2: NORMAL
RBC # BLD: 4.57 M/UL (ref 4.2–5.4)
SODIUM BLD-SCNC: 140 MMOL/L (ref 136–145)
TOTAL PROTEIN: 7.5 G/DL (ref 6.6–8.7)
WBC # BLD: 4.5 K/UL (ref 4.8–10.8)

## 2023-02-28 PROCEDURE — 99213 OFFICE O/P EST LOW 20 MIN: CPT | Performed by: NURSE PRACTITIONER

## 2023-02-28 PROCEDURE — 81025 URINE PREGNANCY TEST: CPT | Performed by: NURSE PRACTITIONER

## 2023-02-28 SDOH — ECONOMIC STABILITY: INCOME INSECURITY: HOW HARD IS IT FOR YOU TO PAY FOR THE VERY BASICS LIKE FOOD, HOUSING, MEDICAL CARE, AND HEATING?: PATIENT DECLINED

## 2023-02-28 SDOH — ECONOMIC STABILITY: HOUSING INSECURITY
IN THE LAST 12 MONTHS, WAS THERE A TIME WHEN YOU DID NOT HAVE A STEADY PLACE TO SLEEP OR SLEPT IN A SHELTER (INCLUDING NOW)?: PATIENT REFUSED

## 2023-02-28 SDOH — ECONOMIC STABILITY: FOOD INSECURITY: WITHIN THE PAST 12 MONTHS, THE FOOD YOU BOUGHT JUST DIDN'T LAST AND YOU DIDN'T HAVE MONEY TO GET MORE.: PATIENT DECLINED

## 2023-02-28 SDOH — ECONOMIC STABILITY: FOOD INSECURITY: WITHIN THE PAST 12 MONTHS, YOU WORRIED THAT YOUR FOOD WOULD RUN OUT BEFORE YOU GOT MONEY TO BUY MORE.: PATIENT DECLINED

## 2023-02-28 ASSESSMENT — ENCOUNTER SYMPTOMS
RESPIRATORY NEGATIVE: 1
NAUSEA: 0
SHORTNESS OF BREATH: 0
EYES NEGATIVE: 1
ALLERGIC/IMMUNOLOGIC NEGATIVE: 1
ABDOMINAL PAIN: 0
CONSTIPATION: 0
COUGH: 0
DIARRHEA: 0
GASTROINTESTINAL NEGATIVE: 1
VOMITING: 0
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
## 2023-06-05 ENCOUNTER — OFFICE VISIT (OUTPATIENT)
Dept: OBGYN CLINIC | Age: 23
End: 2023-06-05

## 2023-06-05 VITALS
WEIGHT: 171 LBS | DIASTOLIC BLOOD PRESSURE: 78 MMHG | BODY MASS INDEX: 27.48 KG/M2 | HEART RATE: 72 BPM | HEIGHT: 66 IN | SYSTOLIC BLOOD PRESSURE: 119 MMHG

## 2023-06-05 DIAGNOSIS — N94.10 DYSPAREUNIA IN FEMALE: ICD-10-CM

## 2023-06-05 DIAGNOSIS — Z11.3 SCREEN FOR STD (SEXUALLY TRANSMITTED DISEASE): ICD-10-CM

## 2023-06-05 DIAGNOSIS — Z30.09 GENERAL COUNSELING AND ADVICE ON CONTRACEPTIVE MANAGEMENT: ICD-10-CM

## 2023-06-05 DIAGNOSIS — Z12.4 SCREENING FOR CERVICAL CANCER: ICD-10-CM

## 2023-06-05 DIAGNOSIS — Z01.419 ENCOUNTER FOR WELL WOMAN EXAM WITH ROUTINE GYNECOLOGICAL EXAM: Primary | ICD-10-CM

## 2023-06-05 RX ORDER — VALACYCLOVIR HYDROCHLORIDE 1 G/1
TABLET, FILM COATED ORAL
COMMUNITY
Start: 2023-05-09

## 2023-06-05 RX ORDER — DROSPIRENONE AND ESTETROL 3-14.2(28)
1 KIT ORAL DAILY
Qty: 3 PACKET | Refills: 3 | Status: SHIPPED | OUTPATIENT
Start: 2023-06-05

## 2023-06-05 NOTE — PROGRESS NOTES
fruits, vegetables, whole grains, low-fat dairy, and lean protein. Choose healthy portions of food. You can use the Nutrition Facts label on food packages as a guide. Eat more fruits and vegetables. You could add vegetables to sandwiches or add fruit to cereal.   Drink water when you are thirsty. Limit soda, juice, and sports drinks. Try to exercise most days. Aim for at least 2½ hours of exercise each week. Keep moving. Work in the garden or take your dog on a walk. Use the stairs instead of the elevator. If you use tobacco or nicotine, try to quit. Ask your doctor about programs and medicines to help you quit. Limit alcohol. Men should have no more than 2 drinks a day. Women should have no more than 1. For some people, no alcohol is the best choice. Follow-up care is a key part of your treatment and safety. Be sure to make and go to all appointments, and call your doctor if you are having problems. It's also a good idea to know your test results and keep a list of the medicines you take. Where can you learn more? Go to http://www.magallon.com/ and enter U807 to learn more about \"A Healthy Lifestyle: Care Instructions. \"  Current as of: November 14, 2022               Content Version: 13.6  © 2006-2023 Healthwise, Incorporated. Care instructions adapted under license by [facility] [place] ([facility]). If you have questions about a medical condition or this instruction, always ask your healthcare professional. [place] [address] any warranty or liability for your use of this information.

## 2023-06-06 ENCOUNTER — TELEPHONE (OUTPATIENT)
Dept: PRIMARY CARE CLINIC | Age: 23
End: 2023-06-06

## 2023-06-06 NOTE — TELEPHONE ENCOUNTER
Андрей had an appointment today at 2:45pm, she called at 2:56 pm stating that she was at the exit down the road and would be here. I advised that I would need to talk to Dr. Corbin Mederos to see if she would still be able to see her. She was not very happy and said she would be here. She showed up to her appointment at 3:01 and Dr. Corbin Mederos said that she was not able to see her and we would need to reschedule. I tried to offer her a 2:15 appt on 6/7 but she refused stating that she was leaving town and would not do an appt that late. I reschedule the appt to 8/8.  She has made an appt through Note for the original appt that I offered on 6/7 at 2:15 pm.

## 2023-06-07 ENCOUNTER — OFFICE VISIT (OUTPATIENT)
Dept: PRIMARY CARE CLINIC | Age: 23
End: 2023-06-07

## 2023-06-07 VITALS
TEMPERATURE: 98.6 F | HEIGHT: 66 IN | RESPIRATION RATE: 16 BRPM | DIASTOLIC BLOOD PRESSURE: 72 MMHG | SYSTOLIC BLOOD PRESSURE: 106 MMHG | BODY MASS INDEX: 27.1 KG/M2 | OXYGEN SATURATION: 97 % | WEIGHT: 168.6 LBS | HEART RATE: 86 BPM

## 2023-06-07 DIAGNOSIS — Z00.00 ENCOUNTER FOR WELL ADULT EXAM WITHOUT ABNORMAL FINDINGS: Primary | ICD-10-CM

## 2023-06-07 LAB
C TRACH DNA CVX QL NAA+PROBE: NOT DETECTED
N GONORRHOEA DNA SPEC QL NAA+PROBE: NOT DETECTED
TRICHOMONAS VAGINALIS DNA: NOT DETECTED

## 2023-06-07 ASSESSMENT — ENCOUNTER SYMPTOMS
BACK PAIN: 0
VOMITING: 0
WHEEZING: 0
COUGH: 0
NAUSEA: 0
EYE DISCHARGE: 0
ABDOMINAL PAIN: 0
COLOR CHANGE: 0
DIARRHEA: 0

## 2023-06-07 NOTE — PROGRESS NOTES
Well Adult Note  Name: Hieu Lanza Date: 2023   MRN: [id number] Sex: Female   Age: 21 y.o. Ethnicity: Non- / Non    : 2000 Race: Jayson De Santiago / Juan Shields is here for well adult exam.  History:  Patient is seen today for yearly physical and checkup. She has a physical form that she is needing filled out for school. She needs a copy of her immunization record as well. She has recently started birth control. She is followed by GYN for routine Paps. She states that overall she feels well. She states that she is having a little bit of vaginal dryness. She states that she has not tried anything for this to this point. Review of Systems   Constitutional:  Negative for activity change, appetite change and fever. HENT:  Negative for congestion and nosebleeds. Eyes:  Negative for discharge. Respiratory:  Negative for cough and wheezing. Cardiovascular:  Negative for chest pain and leg swelling. Gastrointestinal:  Negative for abdominal pain, diarrhea, nausea and vomiting. Genitourinary:  Negative for difficulty urinating, frequency and urgency. Musculoskeletal:  Negative for back pain and gait problem. Skin:  Negative for color change and rash. Neurological:  Negative for dizziness and headaches. Hematological:  Does not bruise/bleed easily. Psychiatric/Behavioral:  Negative for sleep disturbance and suicidal ideas. No Known Allergies      Prior to Visit Medications    Medication Sig Taking?  Authorizing Provider   valACYclovir (VALTREX) 1 g tablet  Yes Historical Provider, MD   Drospirenone-Estetrol (NEXTSTELLIS) 3-14.2 MG TABS Take 1 tablet by mouth daily Yes REGINE Castro         Past Medical History:   Diagnosis Date    Allergy     Environmental allergies        Past Surgical History:   Procedure Laterality Date    ADENOIDECTOMY  2011        TONSILLECTOMY  2011    Dr Maisha Moreland  2020

## 2023-07-07 ENCOUNTER — OFFICE VISIT (OUTPATIENT)
Dept: PRIMARY CARE CLINIC | Age: 23
End: 2023-07-07

## 2023-07-07 VITALS
WEIGHT: 168 LBS | BODY MASS INDEX: 27 KG/M2 | RESPIRATION RATE: 18 BRPM | SYSTOLIC BLOOD PRESSURE: 106 MMHG | HEART RATE: 63 BPM | TEMPERATURE: 98 F | DIASTOLIC BLOOD PRESSURE: 70 MMHG | HEIGHT: 66 IN | OXYGEN SATURATION: 99 %

## 2023-07-07 DIAGNOSIS — N39.0 URINARY TRACT INFECTION WITHOUT HEMATURIA, SITE UNSPECIFIED: Primary | ICD-10-CM

## 2023-07-07 DIAGNOSIS — Z72.51 UNPROTECTED SEX: ICD-10-CM

## 2023-07-07 DIAGNOSIS — R35.0 URINARY FREQUENCY: ICD-10-CM

## 2023-07-07 LAB
APPEARANCE FLUID: CLEAR
BILIRUBIN, POC: NORMAL
BLOOD URINE, POC: NORMAL
C TRACH DNA UR QL NAA+PROBE: NOT DETECTED
CLARITY, POC: CLEAR
COLOR, POC: YELLOW
CONTROL: NORMAL
GLUCOSE URINE, POC: NORMAL
KETONES, POC: NORMAL
LEUKOCYTE EST, POC: NORMAL
N GONORRHOEA DNA UR QL NAA+PROBE: NOT DETECTED
NITRITE, POC: NORMAL
PH, POC: 6
PREGNANCY TEST URINE, POC: NEGATIVE
PROTEIN, POC: NORMAL
SPECIFIC GRAVITY, POC: 1.02
T VAGINALIS DNA UR QL NAA+PROBE: NOT DETECTED
UROBILINOGEN, POC: 0.2

## 2023-07-07 ASSESSMENT — ENCOUNTER SYMPTOMS
GASTROINTESTINAL NEGATIVE: 1
SHORTNESS OF BREATH: 0
EYES NEGATIVE: 1
RESPIRATORY NEGATIVE: 1
COUGH: 0
ALLERGIC/IMMUNOLOGIC NEGATIVE: 1

## 2023-07-07 NOTE — PROGRESS NOTES
[facility] [address] [address] [address]  [address] [place] [address]  Dept: [phone]  Dept Fax: [phone]  Loc: [phone]    [place] is a 21 y.o. female who presents today for her medical conditions/complaints as noted below. [place] Craig is c/o of Frequent/Recurrent UTI (Pt states she had a UTI before and just wants to make sure it has cleared up.) and Stye (Pt has a stye on her left eye and wants something to help it. )        HPI:     HPI 1year-old in office today for recurrent frequent UTI symptoms. That she has had a lot of frequency with urination. States that it started she started to drink more cranberry juice and drinking increased amount of water. She states that the symptoms seem to be better but she has had unprotected sex at that time as well. She is concerned wants make sure she does not have any issues with STDs. Chief Complaint   Patient presents with    Frequent/Recurrent UTI     Pt states she had a UTI before and just wants to make sure it has cleared up. Stye     Pt has a stye on her left eye and wants something to help it.       Past Medical History:   Diagnosis Date    Allergy     Environmental allergies       Past Surgical History:   Procedure Laterality Date    ADENOIDECTOMY  07-        TONSILLECTOMY  07-    Dr Bunch    TYMPANOPLASTY  11/2020    [facility]- Tympanic membrane patch    TYMPANOSTOMY TUBE PLACEMENT         Vitals 7/7/2023 6/7/2023 6/5/2023 2/28/2023 1/27/2023 2/3/9403   SYSTOLIC 963 670 204 775 173 520   DIASTOLIC 70 72 78 76 70 74   Site Left Upper Arm - - Left Upper Arm - -   Position Sitting - - Sitting - -   Cuff Size Medium Adult - - Medium Adult - -   Pulse 63 86 72 60 88 74   Temp 98 98.6 - 97.8 97.5 97.6   Resp 18 16 - - - -   SpO2 99 97 - 99 99 100   Weight 168 lb 168 lb 9.6 oz 171 lb 170 lb 166 lb 165 lb   Height 5' 6\" 5' 6\" 5' 6\" 5' 6\" 5' 6\" 5' 6\"   Body Mass Index 27.12 kg/m2 27.21 kg/m2 27.6 kg/m2

## 2023-11-21 ENCOUNTER — OFFICE VISIT (OUTPATIENT)
Dept: PRIMARY CARE CLINIC | Age: 23
End: 2023-11-21
Payer: COMMERCIAL

## 2023-11-21 VITALS
TEMPERATURE: 97.5 F | SYSTOLIC BLOOD PRESSURE: 122 MMHG | WEIGHT: 171.8 LBS | RESPIRATION RATE: 16 BRPM | DIASTOLIC BLOOD PRESSURE: 78 MMHG | HEART RATE: 65 BPM | BODY MASS INDEX: 27.61 KG/M2 | OXYGEN SATURATION: 98 % | HEIGHT: 66 IN

## 2023-11-21 DIAGNOSIS — Z11.3 SCREENING FOR STD (SEXUALLY TRANSMITTED DISEASE): Primary | ICD-10-CM

## 2023-11-21 DIAGNOSIS — Z20.2 STD EXPOSURE: ICD-10-CM

## 2023-11-21 DIAGNOSIS — Z30.09 GENERAL COUNSELING AND ADVICE ON CONTRACEPTIVE MANAGEMENT: ICD-10-CM

## 2023-11-21 LAB
HCV AB SERPL QL IA: NORMAL
HIV-1 P24 AG: NORMAL
HIV1+2 AB SERPLBLD QL IA.RAPID: NORMAL

## 2023-11-21 PROCEDURE — 99212 OFFICE O/P EST SF 10 MIN: CPT | Performed by: FAMILY MEDICINE

## 2023-11-21 RX ORDER — DROSPIRENONE AND ESTETROL 3-14.2(28)
1 KIT ORAL DAILY
Qty: 3 PACKET | Refills: 3 | Status: SHIPPED | OUTPATIENT
Start: 2023-11-21

## 2023-11-21 ASSESSMENT — ENCOUNTER SYMPTOMS
VOMITING: 0
EYE DISCHARGE: 0
BACK PAIN: 0
WHEEZING: 0
NAUSEA: 0
COUGH: 0
ABDOMINAL PAIN: 0
COLOR CHANGE: 0
DIARRHEA: 0

## 2023-11-21 NOTE — PROGRESS NOTES
SUBJECTIVE:    Patient ID: Rosenda Olsen is a 21 y.o. female. HPI:   Patient is seen today for STD screening. She states that she has not had any exposure that she is aware of but she does like to get checked occasionally. She has not had any vaginal discharge or drainage. She states that she is taking her birth control regularly. She notes her menstrual cycles are regular. She states that she would like to get checked for everything including blood work. Past Medical History:   Diagnosis Date    Allergy     Environmental allergies       Current Outpatient Medications on File Prior to Visit   Medication Sig Dispense Refill    valACYclovir (VALTREX) 1 g tablet        No current facility-administered medications on file prior to visit. No Known Allergies    Review of Systems   Constitutional:  Negative for activity change, appetite change and fever. HENT:  Negative for congestion and nosebleeds. Eyes:  Negative for discharge. Respiratory:  Negative for cough and wheezing. Cardiovascular:  Negative for chest pain and leg swelling. Gastrointestinal:  Negative for abdominal pain, diarrhea, nausea and vomiting. Genitourinary:  Negative for difficulty urinating, frequency and urgency. Musculoskeletal:  Negative for back pain and gait problem. Skin:  Negative for color change and rash. Neurological:  Negative for dizziness and headaches. Hematological:  Does not bruise/bleed easily. Psychiatric/Behavioral:  Negative for sleep disturbance and suicidal ideas. OBJECTIVE:    Physical Exam  Vitals reviewed. Constitutional:       General: She is not in acute distress. Appearance: Normal appearance. She is well-developed. She is not diaphoretic. HENT:      Head: Normocephalic and atraumatic. Right Ear: External ear normal.      Left Ear: External ear normal.   Cardiovascular:      Rate and Rhythm: Normal rate and regular rhythm. Pulses: Normal pulses.       Heart

## 2023-11-23 LAB — RPR SER QL: NORMAL

## 2023-11-24 ENCOUNTER — TELEPHONE (OUTPATIENT)
Dept: PRIMARY CARE CLINIC | Age: 23
End: 2023-11-24

## 2023-11-24 LAB
ATOPOBIUM VAGINAE: ABNORMAL SCORE
C TRACH DNA CVX QL NAA+PROBE: NEGATIVE
CANDIDA ALBICANS, NAA: NEGATIVE
CANDIDA GLABRATA: NEGATIVE
MEGASHAERA: ABNORMAL SCORE
NEISSERIA GONORRHOEAE, DNA: NEGATIVE
VAGINAL PATHOGENS DNA PANEL: NEGATIVE
VAGINOSIS/VAGINITIS, DNA PROBE: ABNORMAL SCORE

## 2023-11-24 RX ORDER — METRONIDAZOLE 500 MG/1
500 TABLET ORAL 2 TIMES DAILY
Qty: 20 TABLET | Refills: 0 | Status: SHIPPED | OUTPATIENT
Start: 2023-11-24 | End: 2023-12-04

## 2023-11-27 NOTE — TELEPHONE ENCOUNTER
The patient called requesting her results of her genital culture. It was positive for BV so Flagyl was sent to the pharmacy. If symptoms or not improving she is to let us know. I did encourage her to get some probiotics as well as to eat yogurt to help reset her pH.

## 2023-12-12 RX ORDER — VALACYCLOVIR HYDROCHLORIDE 1 G/1
1000 TABLET, FILM COATED ORAL 3 TIMES DAILY
Qty: 30 TABLET | Refills: 2 | Status: SHIPPED | OUTPATIENT
Start: 2023-12-12 | End: 2024-01-11

## 2023-12-12 NOTE — TELEPHONE ENCOUNTER
I have never prescribed this for her so I am not sure about directions or what she is taking it for. Can you find out who prescribed it?

## 2024-05-03 ENCOUNTER — TELEPHONE (OUTPATIENT)
Dept: OBGYN CLINIC | Age: 24
End: 2024-05-03

## 2024-05-03 NOTE — TELEPHONE ENCOUNTER
Андрей called to reschedule an appointment for  well woman visit that is on 6/7/24. Андрей stated that she is still in school and is needing to somehow accommodate a different appointment while she is in [place]. Please contact Андрей to coordinate a different appointment if able. Patient stated she ONLY wants to see Diane Figueredo . [facility] was unable to accommodate in the time frame needed. Please be advised that the best time to call Anytime.    Thank you.

## 2024-05-21 ENCOUNTER — TELEPHONE (OUTPATIENT)
Dept: PRIMARY CARE CLINIC | Age: 24
End: 2024-05-21

## 2024-05-21 NOTE — TELEPHONE ENCOUNTER
Patient returned my phone call and talk to patient about Dr. Alvarez deciding to give her another chance after being dismissed from having to many No-Shows.  Informed the patient that if she no shows again that she would be dismissed from the office.  Patient agreed and stated that she would start writing down her appointments.

## 2024-07-02 ENCOUNTER — OFFICE VISIT (OUTPATIENT)
Dept: PRIMARY CARE CLINIC | Age: 24
End: 2024-07-02
Payer: COMMERCIAL

## 2024-07-02 ENCOUNTER — OFFICE VISIT (OUTPATIENT)
Dept: OBGYN CLINIC | Age: 24
End: 2024-07-02
Payer: COMMERCIAL

## 2024-07-02 VITALS
HEIGHT: 66 IN | HEART RATE: 63 BPM | OXYGEN SATURATION: 98 % | SYSTOLIC BLOOD PRESSURE: 110 MMHG | DIASTOLIC BLOOD PRESSURE: 74 MMHG | TEMPERATURE: 97.8 F | BODY MASS INDEX: 27.97 KG/M2 | WEIGHT: 174 LBS | RESPIRATION RATE: 16 BRPM

## 2024-07-02 VITALS
HEIGHT: 66 IN | WEIGHT: 172.4 LBS | BODY MASS INDEX: 27.71 KG/M2 | SYSTOLIC BLOOD PRESSURE: 111 MMHG | HEART RATE: 75 BPM | DIASTOLIC BLOOD PRESSURE: 76 MMHG

## 2024-07-02 DIAGNOSIS — Z30.41 ORAL CONTRACEPTIVE PILL SURVEILLANCE: ICD-10-CM

## 2024-07-02 DIAGNOSIS — Z11.3 SCREEN FOR STD (SEXUALLY TRANSMITTED DISEASE): ICD-10-CM

## 2024-07-02 DIAGNOSIS — Z01.419 ENCOUNTER FOR WELL WOMAN EXAM WITH ROUTINE GYNECOLOGICAL EXAM: Primary | ICD-10-CM

## 2024-07-02 DIAGNOSIS — Z12.4 SCREENING FOR CERVICAL CANCER: ICD-10-CM

## 2024-07-02 DIAGNOSIS — Z00.00 ENCOUNTER FOR WELL ADULT EXAM WITHOUT ABNORMAL FINDINGS: Primary | ICD-10-CM

## 2024-07-02 PROBLEM — B37.9 YEAST INFECTION: Status: RESOLVED | Noted: 2018-01-08 | Resolved: 2024-07-02

## 2024-07-02 LAB
HAV IGM SERPL QL IA: NORMAL
HBV CORE IGM SERPL QL IA: NORMAL
HBV SURFACE AG SERPL QL IA: NORMAL
HCV AB SERPL QL IA: NORMAL
HIV-1 P24 AG: NORMAL
HIV1+2 AB SERPLBLD QL IA.RAPID: NORMAL

## 2024-07-02 PROCEDURE — 99395 PREV VISIT EST AGE 18-39: CPT | Performed by: FAMILY MEDICINE

## 2024-07-02 PROCEDURE — 99395 PREV VISIT EST AGE 18-39: CPT | Performed by: NURSE PRACTITIONER

## 2024-07-02 RX ORDER — DROSPIRENONE AND ESTETROL 3-14.2(28)
1 KIT ORAL DAILY
Qty: 3 PACKET | Refills: 3 | Status: SHIPPED | OUTPATIENT
Start: 2024-07-02

## 2024-07-02 SDOH — ECONOMIC STABILITY: FOOD INSECURITY: WITHIN THE PAST 12 MONTHS, YOU WORRIED THAT YOUR FOOD WOULD RUN OUT BEFORE YOU GOT MONEY TO BUY MORE.: NEVER TRUE

## 2024-07-02 SDOH — ECONOMIC STABILITY: FOOD INSECURITY: WITHIN THE PAST 12 MONTHS, THE FOOD YOU BOUGHT JUST DIDN'T LAST AND YOU DIDN'T HAVE MONEY TO GET MORE.: NEVER TRUE

## 2024-07-02 SDOH — ECONOMIC STABILITY: HOUSING INSECURITY
IN THE LAST 12 MONTHS, WAS THERE A TIME WHEN YOU DID NOT HAVE A STEADY PLACE TO SLEEP OR SLEPT IN A SHELTER (INCLUDING NOW)?: NO

## 2024-07-02 SDOH — ECONOMIC STABILITY: INCOME INSECURITY: HOW HARD IS IT FOR YOU TO PAY FOR THE VERY BASICS LIKE FOOD, HOUSING, MEDICAL CARE, AND HEATING?: NOT HARD AT ALL

## 2024-07-02 ASSESSMENT — ENCOUNTER SYMPTOMS
GASTROINTESTINAL NEGATIVE: 1
WHEEZING: 0
EYES NEGATIVE: 1
ABDOMINAL PAIN: 0
COUGH: 0
BACK PAIN: 0
RESPIRATORY NEGATIVE: 1
COLOR CHANGE: 0
NAUSEA: 0
ALLERGIC/IMMUNOLOGIC NEGATIVE: 1
EYE DISCHARGE: 0
VOMITING: 0
DIARRHEA: 0

## 2024-07-02 ASSESSMENT — PATIENT HEALTH QUESTIONNAIRE - PHQ9
6. FEELING BAD ABOUT YOURSELF - OR THAT YOU ARE A FAILURE OR HAVE LET YOURSELF OR YOUR FAMILY DOWN: NOT AT ALL
5. POOR APPETITE OR OVEREATING: NOT AT ALL
SUM OF ALL RESPONSES TO PHQ QUESTIONS 1-9: 0
1. LITTLE INTEREST OR PLEASURE IN DOING THINGS: NOT AT ALL
8. MOVING OR SPEAKING SO SLOWLY THAT OTHER PEOPLE COULD HAVE NOTICED. OR THE OPPOSITE, BEING SO FIGETY OR RESTLESS THAT YOU HAVE BEEN MOVING AROUND A LOT MORE THAN USUAL: NOT AT ALL
4. FEELING TIRED OR HAVING LITTLE ENERGY: NOT AT ALL
3. TROUBLE FALLING OR STAYING ASLEEP: NOT AT ALL
SUM OF ALL RESPONSES TO PHQ QUESTIONS 1-9: 0
2. FEELING DOWN, DEPRESSED OR HOPELESS: NOT AT ALL
7. TROUBLE CONCENTRATING ON THINGS, SUCH AS READING THE NEWSPAPER OR WATCHING TELEVISION: NOT AT ALL
10. IF YOU CHECKED OFF ANY PROBLEMS, HOW DIFFICULT HAVE THESE PROBLEMS MADE IT FOR YOU TO DO YOUR WORK, TAKE CARE OF THINGS AT HOME, OR GET ALONG WITH OTHER PEOPLE: NOT DIFFICULT AT ALL
SUM OF ALL RESPONSES TO PHQ QUESTIONS 1-9: 0
SUM OF ALL RESPONSES TO PHQ9 QUESTIONS 1 & 2: 0
9. THOUGHTS THAT YOU WOULD BE BETTER OFF DEAD, OR OF HURTING YOURSELF: NOT AT ALL
SUM OF ALL RESPONSES TO PHQ QUESTIONS 1-9: 0

## 2024-07-02 NOTE — PROGRESS NOTES
Well Adult Note  Name: Андрей Arroyo Today’s Date: 2024   MRN: [id number] Sex: Female   Age: 24 y.o. Ethnicity: Non- / Non    : 2000 Race: Black /       Андрей Arroyo is here for well adult exam.  History:  Patient is seen today for yearly physical and checkup.  She just saw OB/GYN today for Pap.  She states that they did some blood work and urine on her.  She states that she is taking her birth control regularly and menses are regular.  She denies any problems today and states that overall she feels well.    Review of Systems   Constitutional:  Negative for activity change, appetite change and fever.   HENT:  Negative for congestion and nosebleeds.    Eyes:  Negative for discharge.   Respiratory:  Negative for cough and wheezing.    Cardiovascular:  Negative for chest pain and leg swelling.   Gastrointestinal:  Negative for abdominal pain, diarrhea, nausea and vomiting.   Genitourinary:  Negative for difficulty urinating, frequency and urgency.   Musculoskeletal:  Negative for back pain and gait problem.   Skin:  Negative for color change and rash.   Neurological:  Negative for dizziness and headaches.   Hematological:  Does not bruise/bleed easily.   Psychiatric/Behavioral:  Negative for sleep disturbance and suicidal ideas.        No Known Allergies      Prior to Visit Medications    Medication Sig Taking? Authorizing Provider   NEXTSTELLIS 3-14.2 MG TABS Take 1 tablet by mouth daily Yes Diane Stewatr APRN         Past Medical History:   Diagnosis Date    Allergy     Environmental allergies        Past Surgical History:   Procedure Laterality Date    ADENOIDECTOMY  2011        TONSILLECTOMY  2011    Dr Bunch    TYMPANOPLASTY  2020    [facility]- Tympanic membrane patch    TYMPANOSTOMY TUBE PLACEMENT           Family History   Problem Relation Age of Onset    Hypertension Mother        Social History     Tobacco Use    Smoking status: Never    Smokeless

## 2024-07-02 NOTE — PATIENT INSTRUCTIONS
ways to manage stress.     If you're feeling depressed or hopeless, talk to someone. A counselor can help. If you don't have a counselor, talk to your doctor.   Talk to your doctor if you think you may have a problem with alcohol or drug use. This includes prescription medicines, marijuana, and other drugs.     Avoid tobacco and nicotine: Don't smoke, vape, or chew. If you need help quitting, talk to your doctor.   Practice safer sex. Getting tested, using condoms or dental dams, and limiting sex partners can help prevent STIs.     Use birth control if it's important to you to prevent pregnancy. Talk with your doctor about your choices and what might be best for you.   Prevent problems where you can. Protect your skin from too much sun, wash your hands, brush your teeth twice a day, and wear a seat belt in the car.   Where can you learn more?  Go to https://www.Soul[employer]de[employer].net/patientEd and enter P072 to learn more about \"Well Visit, Ages 18 to 65: Care Instructions.\"  Current as of: August 6, 2023  Content Version: 14.1  © 7664-4637 [employer].   Care instructions adapted under license by [employer]. If you have questions about a medical condition or this instruction, always ask your healthcare professional. [employer] disclaims any warranty or liability for your use of this information.

## 2024-07-02 NOTE — PROGRESS NOTES
Mary,  Your lab results were normal/stable. Please feel free to my chart or call the office with questions. Anabel Silver M.D. Андрей Arroyo is a 24 y.o. female who presents today for her medical conditions/ complaints as noted below. Андрей Arroyo is c/o of Annual Exam        HPI  Pt presents today for pap smear and breast exam. Pt states she has been having issues with recurrent infections, has had new partners in past year. Wonders if it will effect her in the long run, health wise. Does have partner use condoms. Loves her ocp, needs refills. Agreeable to std screening today    Last mammogram: N/A   Last pap smear:23    Contraception: Nextstellis    :  0  Para:  0  AB:  0  Last bone density: N/A   Last colonoscopy: N/A    Patient's last menstrual period was 2024 (exact date).  No obstetric history on file.    Past Medical History:   Diagnosis Date    Allergy     Environmental allergies      Past Surgical History:   Procedure Laterality Date    ADENOIDECTOMY  2011        TONSILLECTOMY  2011    Dr Bunch    TYMPANOPLASTY  2020    [facility]- Tympanic membrane patch    TYMPANOSTOMY TUBE PLACEMENT       Family History   Problem Relation Age of Onset    Hypertension Mother      Social History     Tobacco Use    Smoking status: Never    Smokeless tobacco: Never   Substance Use Topics    Alcohol use: Not Currently     Comment: Occ       Current Outpatient Medications   Medication Sig Dispense Refill    NEXTSTELLIS 3-14.2 MG TABS Take 1 tablet by mouth daily 3 packet 3     No current facility-administered medications for this visit.     No Known Allergies  Vitals:    24 1011   BP: 111/76   Pulse: 75     Body mass index is 27.83 kg/m².    Review of Systems   Constitutional: Negative.    HENT: Negative.     Eyes: Negative.    Respiratory: Negative.     Cardiovascular: Negative.    Gastrointestinal: Negative.    Endocrine: Negative.    Genitourinary: Negative.  Negative for difficulty urinating, dyspareunia, dysuria, enuresis, frequency, hematuria, menstrual problem, pelvic pain, urgency and vaginal

## 2024-07-03 LAB
C TRACH DNA CVX QL NAA+PROBE: NOT DETECTED
HPV16+18+H RISK 12 DNA SPEC-IMP: NORMAL
N GONORRHOEA DNA SPEC QL NAA+PROBE: NOT DETECTED
TRICHOMONAS VAGINALIS DNA: NOT DETECTED

## 2024-07-04 LAB — RPR SER QL: NORMAL

## 2024-07-11 LAB
HPV HR 12 DNA SPEC QL NAA+PROBE: NOT DETECTED
HPV16 DNA SPEC QL NAA+PROBE: NOT DETECTED
HPV16+18+H RISK 12 DNA SPEC-IMP: NORMAL
HPV18 DNA SPEC QL NAA+PROBE: NOT DETECTED

## 2024-07-12 ENCOUNTER — TELEPHONE (OUTPATIENT)
Dept: OBGYN CLINIC | Age: 24
End: 2024-07-12

## 2024-07-12 NOTE — TELEPHONE ENCOUNTER
Patient left message on nurse line stating she needed information from her appointment for her job. Attempted to return call, left vm for patient to reach out to office to provide more info on what is needed.

## 2024-07-15 ENCOUNTER — TELEPHONE (OUTPATIENT)
Dept: OBGYN CLINIC | Age: 24
End: 2024-07-15

## 2024-07-15 NOTE — TELEPHONE ENCOUNTER
Pt called nurse line stating that she has discussed with Yolanda Stewart in the past about her periods being heavy/painful. Pt started new job and is wanting an excuse stating that she has trouble during her periods and needs extra time during breaks due to cramps. Told patient that I would send an encounter to Yolanda Stewart's MA for that to be discussed with Yolanda. Told patient Pat is in clinic today, pt states after 1:00 she cannot answer phone due to being at work but can answer [employer] message on her break, please advise.

## 2024-07-16 NOTE — TELEPHONE ENCOUNTER
Called pt and informed her that at her last office visit (07/02/24) for physical she did not discuss heavy or painful periods and stated she loved her OCP she was currently on and had no issues. If pt is having heavy and painful periods again, she needs to schedule an appt in office to discuss changing her OCP. Per Yolanda, she is not writing an excuse for her at this time without seeing her.

## 2024-10-15 RX ORDER — VALACYCLOVIR HYDROCHLORIDE 1 G/1
1000 TABLET, FILM COATED ORAL 3 TIMES DAILY
Qty: 30 TABLET | Refills: 2 | Status: SHIPPED | OUTPATIENT
Start: 2024-10-15 | End: 2024-11-14

## 2024-10-27 ENCOUNTER — OFFICE VISIT (OUTPATIENT)
Age: 24
End: 2024-10-27

## 2024-10-27 VITALS
RESPIRATION RATE: 20 BRPM | SYSTOLIC BLOOD PRESSURE: 116 MMHG | HEART RATE: 72 BPM | DIASTOLIC BLOOD PRESSURE: 70 MMHG | BODY MASS INDEX: 27.12 KG/M2 | TEMPERATURE: 97.5 F | WEIGHT: 168 LBS | OXYGEN SATURATION: 99 %

## 2024-10-27 DIAGNOSIS — N30.01 ACUTE CYSTITIS WITH HEMATURIA: Primary | ICD-10-CM

## 2024-10-27 DIAGNOSIS — B00.9 HSV-2 (HERPES SIMPLEX VIRUS 2) INFECTION: Chronic | ICD-10-CM

## 2024-10-27 DIAGNOSIS — N30.01 ACUTE CYSTITIS WITH HEMATURIA: ICD-10-CM

## 2024-10-27 DIAGNOSIS — R31.9 HEMATURIA, UNSPECIFIED TYPE: ICD-10-CM

## 2024-10-27 DIAGNOSIS — Z11.3 SCREENING FOR STDS (SEXUALLY TRANSMITTED DISEASES): ICD-10-CM

## 2024-10-27 LAB
APPEARANCE FLUID: CLEAR
BILIRUBIN, POC: ABNORMAL
BLOOD URINE, POC: ABNORMAL
CLARITY, POC: CLEAR
COLOR, POC: YELLOW
CONTROL: NORMAL
GLUCOSE URINE, POC: NEGATIVE MG/DL
KETONES, POC: ABNORMAL MG/DL
LEUKOCYTE EST, POC: ABNORMAL
NITRITE, POC: NEGATIVE
PH, POC: 6
PREGNANCY TEST URINE, POC: NEGATIVE
PROTEIN, POC: ABNORMAL MG/DL
SPECIFIC GRAVITY, POC: >=1.03
UROBILINOGEN, POC: ABNORMAL MG/DL

## 2024-10-27 RX ORDER — NITROFURANTOIN 25; 75 MG/1; MG/1
100 CAPSULE ORAL 2 TIMES DAILY
Qty: 14 CAPSULE | Refills: 0 | Status: SHIPPED | OUTPATIENT
Start: 2024-10-27 | End: 2024-11-03

## 2024-10-27 RX ORDER — VALACYCLOVIR HYDROCHLORIDE 1 G/1
1000 TABLET, FILM COATED ORAL 3 TIMES DAILY
Qty: 30 TABLET | Refills: 0 | Status: SHIPPED | OUTPATIENT
Start: 2024-10-27 | End: 2024-10-27 | Stop reason: CLARIF

## 2024-10-29 ENCOUNTER — OFFICE VISIT (OUTPATIENT)
Dept: PRIMARY CARE CLINIC | Age: 24
End: 2024-10-29
Payer: COMMERCIAL

## 2024-10-29 VITALS
RESPIRATION RATE: 16 BRPM | HEIGHT: 66 IN | SYSTOLIC BLOOD PRESSURE: 118 MMHG | WEIGHT: 167 LBS | DIASTOLIC BLOOD PRESSURE: 74 MMHG | TEMPERATURE: 98.2 F | BODY MASS INDEX: 26.84 KG/M2 | HEART RATE: 74 BPM | OXYGEN SATURATION: 99 %

## 2024-10-29 DIAGNOSIS — Z11.3 SCREENING FOR STD (SEXUALLY TRANSMITTED DISEASE): ICD-10-CM

## 2024-10-29 DIAGNOSIS — R31.0 GROSS HEMATURIA: Primary | ICD-10-CM

## 2024-10-29 DIAGNOSIS — B00.9 HERPES SIMPLEX VIRUS INFECTION: ICD-10-CM

## 2024-10-29 LAB
ANION GAP SERPL CALCULATED.3IONS-SCNC: 12 MMOL/L (ref 7–19)
BACTERIA UR CULT: NORMAL
BACTERIA URNS QL MICRO: ABNORMAL /HPF
BASOPHILS # BLD: 0 K/UL (ref 0–0.2)
BASOPHILS NFR BLD: 1 % (ref 0–1)
BILIRUB UR QL STRIP: ABNORMAL
BUN SERPL-MCNC: 11 MG/DL (ref 6–20)
C TRACH DNA UR QL NAA+PROBE: NOT DETECTED
CALCIUM SERPL-MCNC: 9.3 MG/DL (ref 8.6–10)
CHLORIDE SERPL-SCNC: 105 MMOL/L (ref 98–111)
CLARITY UR: ABNORMAL
CO2 SERPL-SCNC: 25 MMOL/L (ref 22–29)
COLOR UR: ABNORMAL
CREAT SERPL-MCNC: 0.7 MG/DL (ref 0.5–0.9)
CRYSTALS URNS MICRO: ABNORMAL /HPF
EOSINOPHIL # BLD: 0 K/UL (ref 0–0.6)
EOSINOPHIL NFR BLD: 0.5 % (ref 0–5)
EPI CELLS #/AREA URNS AUTO: 7 /HPF (ref 0–5)
ERYTHROCYTE [DISTWIDTH] IN BLOOD BY AUTOMATED COUNT: 11.5 % (ref 11.5–14.5)
GLUCOSE SERPL-MCNC: 92 MG/DL (ref 70–99)
GLUCOSE UR STRIP.AUTO-MCNC: NEGATIVE MG/DL
HCT VFR BLD AUTO: 40.5 % (ref 37–47)
HGB BLD-MCNC: 12.6 G/DL (ref 12–16)
HGB UR STRIP.AUTO-MCNC: NEGATIVE MG/L
HIV-1 P24 AG: NORMAL
HIV1+2 AB SERPLBLD QL IA.RAPID: NORMAL
HYALINE CASTS #/AREA URNS AUTO: 15 /HPF (ref 0–8)
IMM GRANULOCYTES # BLD: 0 K/UL
KETONES UR STRIP.AUTO-MCNC: NEGATIVE MG/DL
LEUKOCYTE ESTERASE UR QL STRIP.AUTO: ABNORMAL
LYMPHOCYTES # BLD: 1.4 K/UL (ref 1.1–4.5)
LYMPHOCYTES NFR BLD: 36.2 % (ref 20–40)
MCH RBC QN AUTO: 26.9 PG (ref 27–31)
MCHC RBC AUTO-ENTMCNC: 31.1 G/DL (ref 33–37)
MCV RBC AUTO: 86.5 FL (ref 81–99)
MONOCYTES # BLD: 0.4 K/UL (ref 0–0.9)
MONOCYTES NFR BLD: 9.7 % (ref 0–10)
N GONORRHOEA DNA UR QL NAA+PROBE: NOT DETECTED
NEUTROPHILS # BLD: 2.1 K/UL (ref 1.5–7.5)
NEUTS SEG NFR BLD: 52.3 % (ref 50–65)
NITRITE UR QL STRIP.AUTO: NEGATIVE
PH UR STRIP.AUTO: 6 [PH] (ref 5–8)
PLATELET # BLD AUTO: 287 K/UL (ref 130–400)
PMV BLD AUTO: 9.5 FL (ref 9.4–12.3)
POTASSIUM SERPL-SCNC: 3.6 MMOL/L (ref 3.5–5)
PROT UR STRIP.AUTO-MCNC: ABNORMAL MG/DL
RBC # BLD AUTO: 4.68 M/UL (ref 4.2–5.4)
RBC #/AREA URNS HPF: ABNORMAL /HPF (ref 0–2)
SODIUM SERPL-SCNC: 142 MMOL/L (ref 136–145)
SP GR UR STRIP.AUTO: 1.03 (ref 1–1.03)
T VAGINALIS DNA UR QL NAA+PROBE: NOT DETECTED
UROBILINOGEN UR STRIP.AUTO-MCNC: 1 E.U./DL
WBC # BLD AUTO: 3.9 K/UL (ref 4.8–10.8)
WBC #/AREA URNS AUTO: 6 /HPF (ref 0–5)

## 2024-10-29 PROCEDURE — 99214 OFFICE O/P EST MOD 30 MIN: CPT | Performed by: FAMILY MEDICINE

## 2024-10-29 RX ORDER — VALACYCLOVIR HYDROCHLORIDE 1 G/1
1 TABLET, FILM COATED ORAL 3 TIMES DAILY
Qty: 90 TABLET | Refills: 1 | Status: SHIPPED | OUTPATIENT
Start: 2024-10-29

## 2024-10-29 ASSESSMENT — ENCOUNTER SYMPTOMS
WHEEZING: 0
COLOR CHANGE: 0
EYE DISCHARGE: 0
ABDOMINAL PAIN: 0
DIARRHEA: 0
VOMITING: 0
BACK PAIN: 0
COUGH: 0
NAUSEA: 0

## 2024-10-29 NOTE — PROGRESS NOTES
°C) (Temporal)   Resp 16   Ht 1.676 m (5' 6\")   Wt 75.8 kg (167 lb)   LMP 10/13/2024   SpO2 99%   BMI 26.95 kg/m²      ASSESSMENT/PLAN:    1. Gross hematuria  -     Basic Metabolic Panel  -     CBC with Auto Differential  -     Urinalysis with Reflex to Culture  2. Screening for STD (sexually transmitted disease)  -     HIV Rapid 1&2  -     RPR Reflex to Titer and TPPA  -     Chlamydia/N. Gonorrhoeae/T.Vaginalis  3. Herpes simplex virus infection       Urine culture and urine from urgent care were reviewed today in office.  We are going to repeat these.  We did go ahead and get a urine for STD testing as well.  Continue birth control.  She did need a refill on her Valtrex.  This has been sent to the pharmacy for her.    Lab Review   Office Visit on 10/27/2024   Component Date Value    Color, UA 10/27/2024 yellow     Clarity, UA 10/27/2024 clear     Glucose, UA POC 10/27/2024 negative     Bilirubin, UA 10/27/2024 small     Ketones, UA 10/27/2024 trace     Spec Grav, UA 10/27/2024 >=1.030     Blood, UA POC 10/27/2024 moderate     pH, UA 10/27/2024 6.0     Protein, UA POC 10/27/2024 30mg/dL     Urobilinogen, UA 10/27/2024 0.2 E.U./dL     Leukocytes, UA 10/27/2024 trace     Nitrite, UA 10/27/2024 negative     Appearance, Fluid 10/27/2024 Clear     Preg Test, Ur 10/27/2024 negative     Control 10/27/2024 pass     Urine Culture, Routine 10/27/2024 <10,000 CFU/ml mixed skin/urogenital santos. No further workup          PDMP Monitoring:    Last PDMP [place] as Reviewed ([place]):  Review User Review Instant Review Result            Urine Drug Screenings (1 yr)       Urine Drug Screen  Collected: 1/25/2018 11:57 PM (Final result)                  Medication Contract and Consent for Opioid Use Documents Filed        No documents found                     EMR Dragon/transcription disclaimer:  Much of this encounter note is electronic transcription/translation of spoken language toprinted texts.  The electronic translation of

## 2024-10-30 RX ORDER — ACYCLOVIR 800 MG/1
TABLET ORAL
Refills: 0 | OUTPATIENT
Start: 2024-10-30

## 2024-10-30 RX ORDER — NITROFURANTOIN MACROCRYSTALS 100 MG/1
CAPSULE ORAL
Refills: 0 | OUTPATIENT
Start: 2024-10-30

## 2024-10-31 LAB — RPR SER QL: NORMAL

## 2024-11-05 ENCOUNTER — TELEPHONE (OUTPATIENT)
Dept: OBGYN CLINIC | Age: 24
End: 2024-11-05

## 2024-11-06 ENCOUNTER — OFFICE VISIT (OUTPATIENT)
Dept: OBGYN CLINIC | Age: 24
End: 2024-11-06
Payer: COMMERCIAL

## 2024-11-06 VITALS
HEIGHT: 66 IN | BODY MASS INDEX: 26.68 KG/M2 | SYSTOLIC BLOOD PRESSURE: 137 MMHG | HEART RATE: 85 BPM | WEIGHT: 166 LBS | DIASTOLIC BLOOD PRESSURE: 87 MMHG

## 2024-11-06 DIAGNOSIS — Z86.19 HISTORY OF HERPES SIMPLEX INFECTION: ICD-10-CM

## 2024-11-06 DIAGNOSIS — K13.0 LIP LESION: Primary | ICD-10-CM

## 2024-11-06 PROCEDURE — 99213 OFFICE O/P EST LOW 20 MIN: CPT | Performed by: NURSE PRACTITIONER

## 2024-11-06 ASSESSMENT — ENCOUNTER SYMPTOMS
EYES NEGATIVE: 1
RESPIRATORY NEGATIVE: 1
GASTROINTESTINAL NEGATIVE: 1
ALLERGIC/IMMUNOLOGIC NEGATIVE: 1

## 2024-11-06 NOTE — PROGRESS NOTES
Андрей Arroyo is a 24 y.o. female who presents today for her medical conditions/ complaints as noted below. Андрей Arroyo is c/o of Sexually Transmitted Diseases        HPI  Pt had recent genital outbreak, now has area to lip wants to see if herpes related. Has been under a lot of stress. Mentions she also recently has picked at the skin on her lip so maybe that is what caused the lesion    Patient's last menstrual period was 10/13/2024.  No obstetric history on file.    Past Medical History:   Diagnosis Date    Allergy     Environmental allergies      Past Surgical History:   Procedure Laterality Date    ADENOIDECTOMY  07-        TONSILLECTOMY  07-    Dr Bunch    TYMPANOPLASTY  11/2020    [facility]- Tympanic membrane patch    TYMPANOSTOMY TUBE PLACEMENT       Family History   Problem Relation Age of Onset    Hypertension Mother      Social History     Tobacco Use    Smoking status: Never    Smokeless tobacco: Never   Substance Use Topics    Alcohol use: Not Currently     Comment: Occ       Current Outpatient Medications   Medication Sig Dispense Refill    valACYclovir (VALTREX) 1 g tablet Take 1 tablet by mouth 3 times daily 90 tablet 1    NEXTSTELLIS 3-14.2 MG TABS Take 1 tablet by mouth daily 3 packet 3     No current facility-administered medications for this visit.     No Known Allergies  Vitals:    11/06/24 0921   BP: 137/87   Pulse: 85     Body mass index is 26.79 kg/m².    Review of Systems   Constitutional: Negative.    HENT: Negative.     Eyes: Negative.    Respiratory: Negative.     Cardiovascular: Negative.    Gastrointestinal: Negative.    Endocrine: Negative.    Genitourinary: Negative.  Negative for difficulty urinating, dyspareunia, dysuria, enuresis, frequency, hematuria, menstrual problem, pelvic pain, urgency and vaginal discharge.   Musculoskeletal: Negative.    Skin: Negative.    Allergic/Immunologic: Negative.    Neurological: Negative.    Hematological: Negative.